# Patient Record
Sex: MALE | Race: WHITE | Employment: FULL TIME | URBAN - METROPOLITAN AREA
[De-identification: names, ages, dates, MRNs, and addresses within clinical notes are randomized per-mention and may not be internally consistent; named-entity substitution may affect disease eponyms.]

---

## 2022-01-13 ENCOUNTER — APPOINTMENT (OUTPATIENT)
Dept: CT IMAGING | Age: 52
DRG: 871 | End: 2022-01-13
Payer: COMMERCIAL

## 2022-01-13 ENCOUNTER — APPOINTMENT (OUTPATIENT)
Dept: GENERAL RADIOLOGY | Age: 52
DRG: 871 | End: 2022-01-13
Payer: COMMERCIAL

## 2022-01-13 ENCOUNTER — HOSPITAL ENCOUNTER (INPATIENT)
Age: 52
LOS: 2 days | Discharge: HOME | DRG: 871 | End: 2022-01-15
Attending: EMERGENCY MEDICINE | Admitting: INTERNAL MEDICINE
Payer: COMMERCIAL

## 2022-01-13 DIAGNOSIS — J96.01 ACUTE HYPOXEMIC RESPIRATORY FAILURE DUE TO COVID-19 (HCC): Primary | ICD-10-CM

## 2022-01-13 DIAGNOSIS — U07.1 ACUTE HYPOXEMIC RESPIRATORY FAILURE DUE TO COVID-19 (HCC): Primary | ICD-10-CM

## 2022-01-13 PROBLEM — A41.9 SEPSIS (HCC): Status: ACTIVE | Noted: 2022-01-13

## 2022-01-13 LAB
A/G RATIO: 1.3 (ref 1.1–2.2)
ALBUMIN SERPL-MCNC: 4.3 G/DL (ref 3.4–5)
ALP BLD-CCNC: 122 U/L (ref 40–129)
ALT SERPL-CCNC: 22 U/L (ref 10–40)
ANION GAP SERPL CALCULATED.3IONS-SCNC: 14 MMOL/L (ref 3–16)
AST SERPL-CCNC: 32 U/L (ref 15–37)
BASE EXCESS VENOUS: -3.8 MMOL/L (ref -3–3)
BASOPHILS ABSOLUTE: 0 K/UL (ref 0–0.2)
BASOPHILS RELATIVE PERCENT: 0.2 %
BILIRUB SERPL-MCNC: 0.9 MG/DL (ref 0–1)
BUN BLDV-MCNC: 15 MG/DL (ref 7–20)
CALCIUM SERPL-MCNC: 9 MG/DL (ref 8.3–10.6)
CARBOXYHEMOGLOBIN: 5.1 % (ref 0–1.5)
CHLORIDE BLD-SCNC: 97 MMOL/L (ref 99–110)
CO2: 24 MMOL/L (ref 21–32)
CREAT SERPL-MCNC: 0.7 MG/DL (ref 0.9–1.3)
EOSINOPHILS ABSOLUTE: 0 K/UL (ref 0–0.6)
EOSINOPHILS RELATIVE PERCENT: 0.1 %
GFR AFRICAN AMERICAN: >60
GFR NON-AFRICAN AMERICAN: >60
GLUCOSE BLD-MCNC: 251 MG/DL (ref 70–99)
HCO3 VENOUS: 22.5 MMOL/L (ref 23–29)
HCT VFR BLD CALC: 53.2 % (ref 40.5–52.5)
HEMOGLOBIN: 17.9 G/DL (ref 13.5–17.5)
LACTIC ACID, SEPSIS: 1.9 MMOL/L (ref 0.4–1.9)
LYMPHOCYTES ABSOLUTE: 0.5 K/UL (ref 1–5.1)
LYMPHOCYTES RELATIVE PERCENT: 3 %
MAGNESIUM: 1.8 MG/DL (ref 1.8–2.4)
MCH RBC QN AUTO: 30.5 PG (ref 26–34)
MCHC RBC AUTO-ENTMCNC: 33.6 G/DL (ref 31–36)
MCV RBC AUTO: 90.6 FL (ref 80–100)
METHEMOGLOBIN VENOUS: 0.3 %
MONOCYTES ABSOLUTE: 0.4 K/UL (ref 0–1.3)
MONOCYTES RELATIVE PERCENT: 2.4 %
NEUTROPHILS ABSOLUTE: 16.8 K/UL (ref 1.7–7.7)
NEUTROPHILS RELATIVE PERCENT: 94.3 %
O2 SAT, VEN: 67 %
O2 THERAPY: ABNORMAL
PCO2, VEN: 44.8 MMHG (ref 40–50)
PDW BLD-RTO: 13.7 % (ref 12.4–15.4)
PH VENOUS: 7.32 (ref 7.35–7.45)
PHOSPHORUS: 2.5 MG/DL (ref 2.5–4.9)
PLATELET # BLD: 272 K/UL (ref 135–450)
PMV BLD AUTO: 7.5 FL (ref 5–10.5)
PO2, VEN: 33.2 MMHG (ref 25–40)
POTASSIUM REFLEX MAGNESIUM: 5.8 MMOL/L (ref 3.5–5.1)
PROCALCITONIN: 0.14 NG/ML (ref 0–0.15)
RAPID INFLUENZA  B AGN: NEGATIVE
RAPID INFLUENZA A AGN: NEGATIVE
RBC # BLD: 5.87 M/UL (ref 4.2–5.9)
SARS-COV-2, NAAT: DETECTED
SODIUM BLD-SCNC: 135 MMOL/L (ref 136–145)
TCO2 CALC VENOUS: 24 MMOL/L
TOTAL PROTEIN: 7.7 G/DL (ref 6.4–8.2)
TROPONIN: <0.01 NG/ML
WBC # BLD: 17.8 K/UL (ref 4–11)

## 2022-01-13 PROCEDURE — 84100 ASSAY OF PHOSPHORUS: CPT

## 2022-01-13 PROCEDURE — 93005 ELECTROCARDIOGRAM TRACING: CPT | Performed by: PHYSICIAN ASSISTANT

## 2022-01-13 PROCEDURE — 87040 BLOOD CULTURE FOR BACTERIA: CPT

## 2022-01-13 PROCEDURE — 96374 THER/PROPH/DIAG INJ IV PUSH: CPT

## 2022-01-13 PROCEDURE — 83735 ASSAY OF MAGNESIUM: CPT

## 2022-01-13 PROCEDURE — 94761 N-INVAS EAR/PLS OXIMETRY MLT: CPT

## 2022-01-13 PROCEDURE — 84484 ASSAY OF TROPONIN QUANT: CPT

## 2022-01-13 PROCEDURE — 87804 INFLUENZA ASSAY W/OPTIC: CPT

## 2022-01-13 PROCEDURE — 6360000002 HC RX W HCPCS: Performed by: PHYSICIAN ASSISTANT

## 2022-01-13 PROCEDURE — 71045 X-RAY EXAM CHEST 1 VIEW: CPT

## 2022-01-13 PROCEDURE — 82803 BLOOD GASES ANY COMBINATION: CPT

## 2022-01-13 PROCEDURE — 83605 ASSAY OF LACTIC ACID: CPT

## 2022-01-13 PROCEDURE — 99284 EMERGENCY DEPT VISIT MOD MDM: CPT

## 2022-01-13 PROCEDURE — 85025 COMPLETE CBC W/AUTO DIFF WBC: CPT

## 2022-01-13 PROCEDURE — 6370000000 HC RX 637 (ALT 250 FOR IP): Performed by: PHYSICIAN ASSISTANT

## 2022-01-13 PROCEDURE — 2700000000 HC OXYGEN THERAPY PER DAY

## 2022-01-13 PROCEDURE — 6360000004 HC RX CONTRAST MEDICATION: Performed by: PHYSICIAN ASSISTANT

## 2022-01-13 PROCEDURE — 82010 KETONE BODYS QUAN: CPT

## 2022-01-13 PROCEDURE — 87635 SARS-COV-2 COVID-19 AMP PRB: CPT

## 2022-01-13 PROCEDURE — 84145 PROCALCITONIN (PCT): CPT

## 2022-01-13 PROCEDURE — 1200000000 HC SEMI PRIVATE

## 2022-01-13 PROCEDURE — 96361 HYDRATE IV INFUSION ADD-ON: CPT

## 2022-01-13 PROCEDURE — 2580000003 HC RX 258: Performed by: PHYSICIAN ASSISTANT

## 2022-01-13 PROCEDURE — 71260 CT THORAX DX C+: CPT

## 2022-01-13 PROCEDURE — 80053 COMPREHEN METABOLIC PANEL: CPT

## 2022-01-13 RX ORDER — MAGNESIUM SULFATE IN WATER 40 MG/ML
2000 INJECTION, SOLUTION INTRAVENOUS PRN
Status: DISCONTINUED | OUTPATIENT
Start: 2022-01-13 | End: 2022-01-15 | Stop reason: HOSPADM

## 2022-01-13 RX ORDER — 0.9 % SODIUM CHLORIDE 0.9 %
1000 INTRAVENOUS SOLUTION INTRAVENOUS ONCE
Status: COMPLETED | OUTPATIENT
Start: 2022-01-13 | End: 2022-01-13

## 2022-01-13 RX ORDER — GUAIFENESIN/DEXTROMETHORPHAN 100-10MG/5
5 SYRUP ORAL EVERY 4 HOURS PRN
Status: DISCONTINUED | OUTPATIENT
Start: 2022-01-13 | End: 2022-01-15 | Stop reason: HOSPADM

## 2022-01-13 RX ORDER — ONDANSETRON 2 MG/ML
4 INJECTION INTRAMUSCULAR; INTRAVENOUS ONCE
Status: COMPLETED | OUTPATIENT
Start: 2022-01-13 | End: 2022-01-13

## 2022-01-13 RX ORDER — 0.9 % SODIUM CHLORIDE 0.9 %
30 INTRAVENOUS SOLUTION INTRAVENOUS ONCE
Status: COMPLETED | OUTPATIENT
Start: 2022-01-13 | End: 2022-01-13

## 2022-01-13 RX ORDER — ACETAMINOPHEN 325 MG/1
650 TABLET ORAL EVERY 6 HOURS PRN
Status: DISCONTINUED | OUTPATIENT
Start: 2022-01-13 | End: 2022-01-15 | Stop reason: HOSPADM

## 2022-01-13 RX ORDER — ACETAMINOPHEN 500 MG
1000 TABLET ORAL ONCE
Status: COMPLETED | OUTPATIENT
Start: 2022-01-13 | End: 2022-01-13

## 2022-01-13 RX ORDER — DEXAMETHASONE SODIUM PHOSPHATE 10 MG/ML
6 INJECTION INTRAMUSCULAR; INTRAVENOUS EVERY 24 HOURS
Status: DISCONTINUED | OUTPATIENT
Start: 2022-01-14 | End: 2022-01-15 | Stop reason: HOSPADM

## 2022-01-13 RX ORDER — INSULIN GLARGINE 300 U/ML
80 INJECTION, SOLUTION SUBCUTANEOUS EVERY EVENING
Status: ON HOLD | COMMUNITY
End: 2022-01-15 | Stop reason: SDUPTHER

## 2022-01-13 RX ORDER — DEXTROSE MONOHYDRATE 25 G/50ML
12.5 INJECTION, SOLUTION INTRAVENOUS PRN
Status: DISCONTINUED | OUTPATIENT
Start: 2022-01-13 | End: 2022-01-15 | Stop reason: HOSPADM

## 2022-01-13 RX ORDER — SODIUM CHLORIDE 0.9 % (FLUSH) 0.9 %
10 SYRINGE (ML) INJECTION EVERY 12 HOURS SCHEDULED
Status: DISCONTINUED | OUTPATIENT
Start: 2022-01-13 | End: 2022-01-15 | Stop reason: HOSPADM

## 2022-01-13 RX ORDER — ACETAMINOPHEN 650 MG/1
650 SUPPOSITORY RECTAL EVERY 6 HOURS PRN
Status: DISCONTINUED | OUTPATIENT
Start: 2022-01-13 | End: 2022-01-15 | Stop reason: HOSPADM

## 2022-01-13 RX ORDER — SODIUM CHLORIDE 0.9 % (FLUSH) 0.9 %
10 SYRINGE (ML) INJECTION PRN
Status: DISCONTINUED | OUTPATIENT
Start: 2022-01-13 | End: 2022-01-15 | Stop reason: HOSPADM

## 2022-01-13 RX ORDER — POTASSIUM CHLORIDE 7.45 MG/ML
10 INJECTION INTRAVENOUS PRN
Status: DISCONTINUED | OUTPATIENT
Start: 2022-01-13 | End: 2022-01-15 | Stop reason: HOSPADM

## 2022-01-13 RX ORDER — ONDANSETRON 2 MG/ML
4 INJECTION INTRAMUSCULAR; INTRAVENOUS EVERY 6 HOURS PRN
Status: DISCONTINUED | OUTPATIENT
Start: 2022-01-13 | End: 2022-01-15 | Stop reason: HOSPADM

## 2022-01-13 RX ORDER — DEXTROSE MONOHYDRATE 50 MG/ML
100 INJECTION, SOLUTION INTRAVENOUS PRN
Status: DISCONTINUED | OUTPATIENT
Start: 2022-01-13 | End: 2022-01-15 | Stop reason: HOSPADM

## 2022-01-13 RX ORDER — PROMETHAZINE HYDROCHLORIDE 25 MG/1
12.5 TABLET ORAL EVERY 6 HOURS PRN
Status: DISCONTINUED | OUTPATIENT
Start: 2022-01-13 | End: 2022-01-15 | Stop reason: HOSPADM

## 2022-01-13 RX ORDER — VITAMIN B COMPLEX
2000 TABLET ORAL DAILY
Status: DISCONTINUED | OUTPATIENT
Start: 2022-01-13 | End: 2022-01-15 | Stop reason: HOSPADM

## 2022-01-13 RX ORDER — SODIUM CHLORIDE 9 MG/ML
25 INJECTION, SOLUTION INTRAVENOUS PRN
Status: DISCONTINUED | OUTPATIENT
Start: 2022-01-13 | End: 2022-01-15 | Stop reason: HOSPADM

## 2022-01-13 RX ORDER — NICOTINE POLACRILEX 4 MG
15 LOZENGE BUCCAL PRN
Status: DISCONTINUED | OUTPATIENT
Start: 2022-01-13 | End: 2022-01-15 | Stop reason: HOSPADM

## 2022-01-13 RX ADMIN — IOPAMIDOL 75 ML: 755 INJECTION, SOLUTION INTRAVENOUS at 20:32

## 2022-01-13 RX ADMIN — SODIUM CHLORIDE 2244 ML: 9 INJECTION, SOLUTION INTRAVENOUS at 19:40

## 2022-01-13 RX ADMIN — ACETAMINOPHEN 1000 MG: 500 TABLET ORAL at 18:40

## 2022-01-13 RX ADMIN — SODIUM CHLORIDE 1000 ML: 9 INJECTION, SOLUTION INTRAVENOUS at 18:33

## 2022-01-13 RX ADMIN — ONDANSETRON 4 MG: 2 INJECTION INTRAMUSCULAR; INTRAVENOUS at 18:40

## 2022-01-13 ASSESSMENT — PAIN SCALES - GENERAL
PAINLEVEL_OUTOF10: 0
PAINLEVEL_OUTOF10: 7
PAINLEVEL_OUTOF10: 5

## 2022-01-13 ASSESSMENT — PAIN SCALES - WONG BAKER: WONGBAKER_NUMERICALRESPONSE: 6

## 2022-01-13 NOTE — LETTER
Joy Pozo was seen and treated in our medical surgical department from 1/13/2022 to 1/15/2022. He may return to work without restrictions on 01/27/2022. If you have any questions or concerns, please don't hesitate to call.       Shahzad Huntley  Attending provider        Jaycob López RN  Discharging RN

## 2022-01-13 NOTE — ED PROVIDER NOTES
[address] [facility]  ED      CHIEF COMPLAINT  Concern For COVID-19 (+emesis +nausea, +diarrhea started over the weekend with a \"cold\")      SHARED SERVICE VISIT  Evaluated by LIDYA. My supervising physician was available for consultation. HISTORY OF PRESENT ILLNESS  Nate Willson is a 46 y.o. male history of diabetes, DKA; this emergency department for evaluation of nausea vomiting diarrhea. Patient states over the past week he has had cold-like symptoms with a cough and generalized feeling unwell. However this progressed and worsened to the point now he is unable to tolerate oral intake. He states he has not tried any medications. No over-the-counter Tylenol. He has not taken his diabetes medications due to not being able to tolerate oral intake. Is insulin-dependent. Unknown last A1c. No chest pain difficulty breathing. No pleuritic chest pain. No shortness of breath. No other complaints, modifying factors or associated symptoms. Nursing notes reviewed. Past Medical History:   Diagnosis Date    Chronic arm pain     Diabetes mellitus ([facility] [address]      Past Surgical History:   Procedure Laterality Date    APPENDECTOMY       History reviewed. No pertinent family history.   Social History     Socioeconomic History    Marital status:      Spouse name: Not on file    Number of children: Not on file    Years of education: Not on file    Highest education level: Not on file   Occupational History    Not on file   Tobacco Use    Smoking status: Current Every Day Smoker     Packs/day: 0.50     Types: Cigarettes    Smokeless tobacco: Not on file   Substance and Sexual Activity    Alcohol use: No    Drug use: No    Sexual activity: Not on file   Other Topics Concern    Not on file   Social History Narrative    Not on file     Social Determinants of Health     Financial Resource Strain:     Difficulty of Paying Living Expenses: Not on file   Food Insecurity:     Worried About Running Out of Food in the Last Year: Not on file    Ran Out of Food in the Last Year: Not on file   Transportation Needs:     Lack of Transportation (Medical): Not on file    Lack of Transportation (Non-Medical):  Not on file   Physical Activity:     Days of Exercise per Week: Not on file    Minutes of Exercise per Session: Not on file   Stress:     Feeling of Stress : Not on file   Social Connections:     Frequency of Communication with Friends and Family: Not on file    Frequency of Social Gatherings with Friends and Family: Not on file    Attends Mormonism Services: Not on file    Active Member of [address] [place] [address] [employer] or Organizations: Not on file    Attends Club or Organization Meetings: Not on file    Marital Status: Not on file   Intimate Partner Violence:     Fear of Current or Ex-Partner: Not on file    Emotionally Abused: Not on file    Physically Abused: Not on file    Sexually Abused: Not on file   Housing Stability:     Unable to Pay for Housing in the Last Year: Not on file    Number of [place] in the Last Year: Not on file    Unstable Housing in the Last Year: Not on file     Current Facility-Administered Medications   Medication Dose Route Frequency Provider Last Rate Last Admin    sodium chloride flush 0.9 % injection 10 mL  10 mL IntraVENous 2 times per day Weed Artis A Laure, DO        sodium chloride flush 0.9 % injection 10 mL  10 mL IntraVENous PRN Andres Beachzi, DO        0.9 % sodium chloride infusion  25 mL IntraVENous PRN April Fagan Laure, DO        potassium chloride 10 mEq/100 mL IVPB (Peripheral Line)  10 mEq IntraVENous PRN April Palacioss Laure, DO        magnesium sulfate 2000 mg in 50 mL IVPB premix  2,000 mg IntraVENous PRN Andres Kelsey, DO        promethazine (PHENERGAN) tablet 12.5 mg  12.5 mg Oral Q6H PRN Brittanimad A Laure, DO        Or    ondansetron (ZOFRAN) injection 4 mg  4 mg IntraVENous Q6H PRN Crystald JOSE Kelsey, DO        [START ON 1/14/2022] enoxaparin (LOVENOX) injection negative    PHYSICAL EXAM  /85   Pulse 129   Temp 98 °F (36.7 °C) (Temporal)   Resp 26   Ht 5' 10\" (1.778 m)   Wt 165 lb (74.8 kg)   SpO2 94%   BMI 23.68 kg/m²   GENERAL APPEARANCE: Awake and alert. Cooperative. Ill-appearing  HEAD: Normocephalic. Atraumatic. EYES: EOM's grossly intact. ENT: Mucous membranes are moist.   NECK: Supple. HEART: Tachycardic, no murmur. LUNGS: Respirations unlabored. CTAB. Good air exchange. Speaking comfortably in full sentences. ABDOMEN: Soft. Non-distended. Non-tender. No guarding or rebound. No masses. No organomegaly. EXTREMITIES: No peripheral edema. Moves all extremities equally. All extremities neurovascularly intact. SKIN: Warm and dry. No acute rashes. NEUROLOGICAL: Alert and oriented. CN's 2-12 intact. No gross facial drooping. Strength 5/5, sensation intact. PSYCHIATRIC: Normal mood and affect. RADIOLOGY  CT CHEST PULMONARY EMBOLISM W CONTRAST   Final Result   1. Scattered ground-glass opacities could represent COVID pneumonia. 2.  Artifact degraded evaluation of the pulmonary arteries. No acute   pulmonary embolism to the proximal segmental arteries. 3. Other findings as described.          XR CHEST PORTABLE   Final Result   Ill-defined bilateral pulmonary opacities suggesting COVID pneumonia in this   COVID positive patient             LABS  Labs Reviewed   COVID-19, RAPID - Abnormal; Notable for the following components:       Result Value    SARS-CoV-2, NAAT DETECTED (*)     All other components within normal limits    Narrative:     Gilda January tel. [phone],  Microbiology results called to and read back by Kira Galindo RN, 01/13/2022  19:16, by Mathew Serrano  Performed at:  [address], [address] [place], [place] [address]   Phone [phone]   CBC WITH AUTO DIFFERENTIAL - Abnormal; Notable for the following components:    WBC 17.8 (*)     Hemoglobin 17.9 (*)     Hematocrit 53.2 (*) Neutrophils Absolute 16.8 (*)     Lymphocytes Absolute 0.5 (*)     All other components within normal limits    Narrative:     Performed at:  [address], [facility] [place]   Phone [phone]   COMPREHENSIVE METABOLIC PANEL W/ REFLEX TO MG FOR LOW K - Abnormal; Notable for the following components:    Sodium 135 (*)     Potassium reflex Magnesium 5.8 (*)     Chloride 97 (*)     Glucose 251 (*)     CREATININE 0.7 (*)     All other components within normal limits    Narrative:     Performed at:  [address], [facility] [place]   Phone [phone]   BLOOD GAS, VENOUS - Abnormal; Notable for the following components:    pH, Hoang 7.318 (*)     HCO3, Venous 22.5 (*)     Base Excess, Hoang -3.8 (*)     Carboxyhemoglobin 5.1 (*)     All other components within normal limits    Narrative:     Performed at:  [address], [facility] [place]   Phone [phone]   RAPID INFLUENZA A/B ANTIGENS    Narrative:     Performed at:  [address], [facility] [place]   Phone [phone]   CULTURE, BLOOD 1   CULTURE, BLOOD 2   LEGIONELLA ANTIGEN, URINE   STREP PNEUMONIAE ANTIGEN   CULTURE, RESPIRATORY   MRSA DNA PROBE, NASAL   TROPONIN    Narrative:     Performed at:  [address], [facility] [place]   Phone [phone]   PHOSPHORUS    Narrative:     Performed at:  [address], [facility] [place]   Phone [phone]   MAGNESIUM    Narrative:     Performed at:  [address], [facility] [place]   Phone [phone]   LACTATE, SEPSIS    Narrative:     Performed at:  [address], [facility] [place]   Phone [phone]   PROCALCITONIN    Narrative:     Performed at:  [facility]) - [address],  [place]   Phone [phone]   LACTATE, SEPSIS   ACETONE   BETA-HYDROXYBUTYRATE   COMPREHENSIVE METABOLIC PANEL W/ REFLEX TO MG FOR LOW K   CBC WITH AUTO DIFFERENTIAL   PROTIME-INR   APTT   FIBRINOGEN   C-REACTIVE PROTEIN   C-REACTIVE PROTEIN   LACTATE DEHYDROGENASE   FERRITIN   D-DIMER, QUANTITATIVE   D-DIMER, QUANTITATIVE   TROPONIN   LACTIC ACID, PLASMA   VITAMIN D 25 HYDROXY   URINALYSIS   HEMOGLOBIN A1C   POTASSIUM   POCT GLUCOSE   POCT GLUCOSE   POCT GLUCOSE   POCT GLUCOSE       PROCEDURES  Unless otherwise noted below, none  Procedures    CRITICAL CARE TIME  The total critical care time spent while evaluating and treating this patient was 34minutes. This excludes time spent doing separately billable procedures. This includes time at the bedside, data interpretation, medication management, obtaining critical history from collateral sources if the patient is unable to provide it directly, and physician consultation. Specifics of interventions taken and potentially life-threatening diagnostic considerations are listed above in the medical decision making. MDM  MDM  57-year-old male history of diabetes prior episodes of DKA, insulin-dependent has ED for evaluation of nausea vomiting diarrhea and cold-like symptoms. Unable to tolerate oral intake. Arrival to ED patient was tachycardic at a rate 150. He is rather ill-appearing with frequent coughing. He has no complaints of chest pain, pleuritic chest pain difficulty breathing. Oxygen is 95% on room air. He is afebrile. Normotensive. At this time initial concerns for diabetic ketoacidosis given his history. Will evaluate and start IV fluids give Zofran and Tylenol. Also test for influenza and COVID-19. He was placed on cardiac monitor to risk of arrhythmia.     On continuous continuous pulse oximetry patient did become hypoxic while on room air; and was placed on nasal cannula 2L. Patient will receive additional fluids totaling 30 cc/kg to help correct this tachycardia. Given his persistent tachycardia and hypoxic state will obtain CTA of the chest to rule out pulmonary embolism. Pending that we will plan to admit to the hospital service for acute respiratory failure secondary to COVID-19. DISPOSITION  Admission for respiratory failure    CLINICAL IMPRESSION  1.  Acute hypoxemic respiratory failure due to COVID-19 [facility])            Festus Coronel PA-C  01/13/22 3753

## 2022-01-14 PROBLEM — U07.1 COVID: Status: ACTIVE | Noted: 2022-01-14

## 2022-01-14 LAB
A/G RATIO: 1.2 (ref 1.1–2.2)
ALBUMIN SERPL-MCNC: 3.2 G/DL (ref 3.4–5)
ALP BLD-CCNC: 81 U/L (ref 40–129)
ALT SERPL-CCNC: 15 U/L (ref 10–40)
ANION GAP SERPL CALCULATED.3IONS-SCNC: 10 MMOL/L (ref 3–16)
APTT: 28.8 SEC (ref 26.2–38.6)
AST SERPL-CCNC: 14 U/L (ref 15–37)
BASOPHILS ABSOLUTE: 0 K/UL (ref 0–0.2)
BASOPHILS RELATIVE PERCENT: 0.1 %
BETA-HYDROXYBUTYRATE: 0.81 MMOL/L (ref 0–0.27)
BILIRUB SERPL-MCNC: 0.4 MG/DL (ref 0–1)
BILIRUBIN URINE: NEGATIVE
BLOOD, URINE: NEGATIVE
BUN BLDV-MCNC: 18 MG/DL (ref 7–20)
C-REACTIVE PROTEIN: 70.7 MG/L (ref 0–5.1)
CALCIUM SERPL-MCNC: 8 MG/DL (ref 8.3–10.6)
CHLORIDE BLD-SCNC: 100 MMOL/L (ref 99–110)
CLARITY: CLEAR
CO2: 23 MMOL/L (ref 21–32)
COLOR: YELLOW
CREAT SERPL-MCNC: 0.7 MG/DL (ref 0.9–1.3)
D DIMER: 851 NG/ML DDU (ref 0–229)
EKG ATRIAL RATE: 139 BPM
EKG DIAGNOSIS: NORMAL
EKG P AXIS: 63 DEGREES
EKG P-R INTERVAL: 136 MS
EKG Q-T INTERVAL: 288 MS
EKG QRS DURATION: 76 MS
EKG QTC CALCULATION (BAZETT): 438 MS
EKG R AXIS: -4 DEGREES
EKG T AXIS: 74 DEGREES
EKG VENTRICULAR RATE: 139 BPM
EOSINOPHILS ABSOLUTE: 0 K/UL (ref 0–0.6)
EOSINOPHILS RELATIVE PERCENT: 0 %
ESTIMATED AVERAGE GLUCOSE: 223.1 MG/DL
FERRITIN: 166.2 NG/ML (ref 30–400)
FIBRINOGEN: 397 MG/DL (ref 200–397)
GFR AFRICAN AMERICAN: >60
GFR NON-AFRICAN AMERICAN: >60
GLUCOSE BLD-MCNC: 101 MG/DL (ref 70–99)
GLUCOSE BLD-MCNC: 102 MG/DL (ref 70–99)
GLUCOSE BLD-MCNC: 138 MG/DL (ref 70–99)
GLUCOSE BLD-MCNC: 166 MG/DL (ref 70–99)
GLUCOSE BLD-MCNC: 230 MG/DL (ref 70–99)
GLUCOSE BLD-MCNC: 252 MG/DL (ref 70–99)
GLUCOSE BLD-MCNC: 267 MG/DL (ref 70–99)
GLUCOSE BLD-MCNC: 331 MG/DL (ref 70–99)
GLUCOSE BLD-MCNC: 53 MG/DL (ref 70–99)
GLUCOSE URINE: >=1000 MG/DL
HBA1C MFR BLD: 9.4 %
HCT VFR BLD CALC: 43.4 % (ref 40.5–52.5)
HEMOGLOBIN: 14.8 G/DL (ref 13.5–17.5)
INR BLD: 1.03 (ref 0.88–1.12)
KETONES, URINE: ABNORMAL MG/DL
L. PNEUMOPHILA SEROGP 1 UR AG: NORMAL
LACTATE DEHYDROGENASE: 163 U/L (ref 100–190)
LACTIC ACID, SEPSIS: 0.9 MMOL/L (ref 0.4–1.9)
LACTIC ACID: 0.9 MMOL/L (ref 0.4–2)
LEUKOCYTE ESTERASE, URINE: NEGATIVE
LYMPHOCYTES ABSOLUTE: 0.3 K/UL (ref 1–5.1)
LYMPHOCYTES RELATIVE PERCENT: 3.1 %
MCH RBC QN AUTO: 31.3 PG (ref 26–34)
MCHC RBC AUTO-ENTMCNC: 34.2 G/DL (ref 31–36)
MCV RBC AUTO: 91.5 FL (ref 80–100)
MICROSCOPIC EXAMINATION: ABNORMAL
MONOCYTES ABSOLUTE: 0.2 K/UL (ref 0–1.3)
MONOCYTES RELATIVE PERCENT: 1.7 %
NEUTROPHILS ABSOLUTE: 10 K/UL (ref 1.7–7.7)
NEUTROPHILS RELATIVE PERCENT: 95.1 %
NITRITE, URINE: NEGATIVE
PDW BLD-RTO: 13.9 % (ref 12.4–15.4)
PERFORMED ON: ABNORMAL
PH UA: 5 (ref 5–8)
PLATELET # BLD: 210 K/UL (ref 135–450)
PMV BLD AUTO: 7.2 FL (ref 5–10.5)
POTASSIUM REFLEX MAGNESIUM: 4.3 MMOL/L (ref 3.5–5.1)
POTASSIUM SERPL-SCNC: 4.5 MMOL/L (ref 3.5–5.1)
PROTEIN UA: NEGATIVE MG/DL
PROTHROMBIN TIME: 11.7 SEC (ref 9.9–12.7)
RBC # BLD: 4.74 M/UL (ref 4.2–5.9)
SODIUM BLD-SCNC: 133 MMOL/L (ref 136–145)
SPECIFIC GRAVITY UA: 1.01 (ref 1–1.03)
STREP PNEUMONIAE ANTIGEN, URINE: NORMAL
TOTAL PROTEIN: 5.9 G/DL (ref 6.4–8.2)
TROPONIN: <0.01 NG/ML
URINE TYPE: ABNORMAL
UROBILINOGEN, URINE: 0.2 E.U./DL
VITAMIN D 25-HYDROXY: 16.9 NG/ML
WBC # BLD: 10.5 K/UL (ref 4–11)

## 2022-01-14 PROCEDURE — 6370000000 HC RX 637 (ALT 250 FOR IP): Performed by: INTERNAL MEDICINE

## 2022-01-14 PROCEDURE — 2700000000 HC OXYGEN THERAPY PER DAY

## 2022-01-14 PROCEDURE — 2580000003 HC RX 258: Performed by: INTERNAL MEDICINE

## 2022-01-14 PROCEDURE — 1200000000 HC SEMI PRIVATE

## 2022-01-14 PROCEDURE — 84132 ASSAY OF SERUM POTASSIUM: CPT

## 2022-01-14 PROCEDURE — 82306 VITAMIN D 25 HYDROXY: CPT

## 2022-01-14 PROCEDURE — 85610 PROTHROMBIN TIME: CPT

## 2022-01-14 PROCEDURE — 36415 COLL VENOUS BLD VENIPUNCTURE: CPT

## 2022-01-14 PROCEDURE — 87449 NOS EACH ORGANISM AG IA: CPT

## 2022-01-14 PROCEDURE — 85379 FIBRIN DEGRADATION QUANT: CPT

## 2022-01-14 PROCEDURE — 83615 LACTATE (LD) (LDH) ENZYME: CPT

## 2022-01-14 PROCEDURE — 83605 ASSAY OF LACTIC ACID: CPT

## 2022-01-14 PROCEDURE — 2500000003 HC RX 250 WO HCPCS: Performed by: INTERNAL MEDICINE

## 2022-01-14 PROCEDURE — 85025 COMPLETE CBC W/AUTO DIFF WBC: CPT

## 2022-01-14 PROCEDURE — 94761 N-INVAS EAR/PLS OXIMETRY MLT: CPT

## 2022-01-14 PROCEDURE — 80053 COMPREHEN METABOLIC PANEL: CPT

## 2022-01-14 PROCEDURE — 87641 MR-STAPH DNA AMP PROBE: CPT

## 2022-01-14 PROCEDURE — 83036 HEMOGLOBIN GLYCOSYLATED A1C: CPT

## 2022-01-14 PROCEDURE — 93010 ELECTROCARDIOGRAM REPORT: CPT | Performed by: INTERNAL MEDICINE

## 2022-01-14 PROCEDURE — 81003 URINALYSIS AUTO W/O SCOPE: CPT

## 2022-01-14 PROCEDURE — 6360000002 HC RX W HCPCS: Performed by: INTERNAL MEDICINE

## 2022-01-14 PROCEDURE — 82728 ASSAY OF FERRITIN: CPT

## 2022-01-14 PROCEDURE — 85730 THROMBOPLASTIN TIME PARTIAL: CPT

## 2022-01-14 PROCEDURE — 85384 FIBRINOGEN ACTIVITY: CPT

## 2022-01-14 PROCEDURE — 84484 ASSAY OF TROPONIN QUANT: CPT

## 2022-01-14 PROCEDURE — 86140 C-REACTIVE PROTEIN: CPT

## 2022-01-14 RX ORDER — INSULIN GLARGINE 100 [IU]/ML
45 INJECTION, SOLUTION SUBCUTANEOUS 2 TIMES DAILY
Status: DISCONTINUED | OUTPATIENT
Start: 2022-01-14 | End: 2022-01-15 | Stop reason: HOSPADM

## 2022-01-14 RX ORDER — INSULIN GLARGINE 100 [IU]/ML
60 INJECTION, SOLUTION SUBCUTANEOUS ONCE
Status: COMPLETED | OUTPATIENT
Start: 2022-01-14 | End: 2022-01-14

## 2022-01-14 RX ADMIN — INSULIN GLARGINE 60 UNITS: 100 INJECTION, SOLUTION SUBCUTANEOUS at 12:02

## 2022-01-14 RX ADMIN — INSULIN LISPRO 1 UNITS: 100 INJECTION, SOLUTION INTRAVENOUS; SUBCUTANEOUS at 04:21

## 2022-01-14 RX ADMIN — INSULIN LISPRO 12 UNITS: 100 INJECTION, SOLUTION INTRAVENOUS; SUBCUTANEOUS at 08:00

## 2022-01-14 RX ADMIN — SODIUM CHLORIDE, PRESERVATIVE FREE 10 ML: 5 INJECTION INTRAVENOUS at 00:55

## 2022-01-14 RX ADMIN — SODIUM CHLORIDE, PRESERVATIVE FREE 10 ML: 5 INJECTION INTRAVENOUS at 21:41

## 2022-01-14 RX ADMIN — ENOXAPARIN SODIUM 30 MG: 30 INJECTION SUBCUTANEOUS at 21:40

## 2022-01-14 RX ADMIN — CEFTRIAXONE SODIUM 1000 MG: 1 INJECTION, POWDER, FOR SOLUTION INTRAMUSCULAR; INTRAVENOUS at 00:53

## 2022-01-14 RX ADMIN — SODIUM CHLORIDE, PRESERVATIVE FREE 10 ML: 5 INJECTION INTRAVENOUS at 08:06

## 2022-01-14 RX ADMIN — INSULIN LISPRO 6 UNITS: 100 INJECTION, SOLUTION INTRAVENOUS; SUBCUTANEOUS at 12:03

## 2022-01-14 RX ADMIN — INSULIN LISPRO 20 UNITS: 100 INJECTION, SOLUTION INTRAVENOUS; SUBCUTANEOUS at 16:37

## 2022-01-14 RX ADMIN — Medication 2000 UNITS: at 00:54

## 2022-01-14 RX ADMIN — INSULIN LISPRO 9 UNITS: 100 INJECTION, SOLUTION INTRAVENOUS; SUBCUTANEOUS at 00:59

## 2022-01-14 RX ADMIN — ENOXAPARIN SODIUM 30 MG: 30 INJECTION SUBCUTANEOUS at 08:06

## 2022-01-14 RX ADMIN — DOXYCYCLINE 100 MG: 100 INJECTION, POWDER, LYOPHILIZED, FOR SOLUTION INTRAVENOUS at 02:30

## 2022-01-14 RX ADMIN — INSULIN LISPRO 20 UNITS: 100 INJECTION, SOLUTION INTRAVENOUS; SUBCUTANEOUS at 12:02

## 2022-01-14 RX ADMIN — Medication 2000 UNITS: at 08:06

## 2022-01-14 RX ADMIN — DEXAMETHASONE SODIUM PHOSPHATE 6 MG: 10 INJECTION INTRAMUSCULAR; INTRAVENOUS at 00:49

## 2022-01-14 ASSESSMENT — PAIN SCALES - GENERAL: PAINLEVEL_OUTOF10: 0

## 2022-01-14 NOTE — CARE COORDINATION
CASE MANAGEMENT INITIAL ASSESSMENT      Reviewed chart and completed assessment with patient:via phone  Explained Case Management role/services. Primary contact information:[facility] Decision Maker :   Primary Decision Maker: Lisa Luther - Brother/Sister - [phone]    Secondary Decision Maker: Dory Goodwin - Aunt/Uncle - [phone]          Can this person be reached and be able to respond quickly, such as within a few minutes or hours? Yes    Admit date/status:1/13/22  Diagnosis:sepsis   Is this a Readmission?:  No      Insurance:med[employer]   Precert required for SNF: No       3 night stay required: No    Living arrangements, Adls, care needs, prior to admission:staying with cousin in his home. In town currently for work. Transportation:private     Durable Medical Equipment at home:  Walker__Cane__RTS__ BSC__Shower Chair__  02__ HHN__ CPAP__  BiPap__  Hospital Bed__ W/C___ Other__________    Services in the home and/or outpatient, prior to [address] [facility] [address] Notification (HEN):needed for SNF    Barriers to discharge:no PCP in this area, stated per his work since he is out of town he must follow with local clinics    Plan/comments:spoke with patient. Plans on returning home to [facility] at d/c. Currently on 3LNC o2. Reported IPTA and denied any DCP needs. Will follow for new O2.  Jocelyn Rivera RN       ECOC on chart for MD signature

## 2022-01-14 NOTE — H&P
Hospital Medicine History & Physical      PCP: Referring Not In System (Inactive)    Date of Admission: 1/13/2022    Date of Service: Pt seen/examined on 1/13/2022    Pt seen/examined face to face on and admitted as inpatient with expected LOS greater than two midnights due to medical therapy    Chief Complaint:    Chief Complaint   Patient presents with    Concern For COVID-19     +emesis +nausea, +diarrhea started over the weekend with a \"cold\"        History Of Present Illness:      46 y.o. male who presented to [facility] with past medical history of diabetes, presented to the ED with history diarrhea. Patient reported that she been having some shortness of breath with cough and generally feeling unwell is progressively worsening causing patient not to be able to take oral intake or try Tylenol for pain. Patient also reports that she has not been taking any of her glycemic medication. No associated chest pain, abdominal pain, dysuria. No known relieving or exacerbating factor. Patient continues to smoke daily. Patient reports that he is not vaccinated and denies having any exposures with any family members or family gatherings. .  Patient reported that shortness of breath, and worse today specifically with exertion. Past Medical History:          Diagnosis Date    Chronic arm pain     Diabetes mellitus ([address]        Past Surgical History:          Procedure Laterality Date    APPENDECTOMY         Medications Prior to Admission:      Prior to Admission medications    Medication Sig Start Date End Date Taking? Authorizing Provider   insulin glargine (LANTUS) 100 UNIT/ML injection vial Inject 75 Units into the skin nightly. Historical Provider, MD   insulin lispro (HUMALOG) 100 UNIT/ML injection vial Inject  into the skin 3 times daily (before meals). ssi    Historical Provider, MD   ibuprofen (ADVIL;MOTRIN) 600 MG tablet Take 1 tablet by mouth every 6 hours as needed for Pain.  9/21/14 Aj Ron MD       Allergies:  Patient has no known allergies. Social History:          TOBACCO:   reports that he has been smoking cigarettes. He has been smoking about 0.50 packs per day. He does not have any smokeless tobacco history on file. ETOH:   reports no history of alcohol use. E-Cigarettes/Vaping Use     Questions Responses    E-Cigarette/Vaping Use     Start Date     Passive Exposure     Quit Date     Counseling Given     Comments             Family History:      Reviewed in detail, and noncontributory    History reviewed. No pertinent family history. REVIEW OF SYSTEMS:     Constitutional:  No Fever, No Chills, No Night Sweats  ENT/Mouth:  No Nasal Congestion,  No Hoarseness, No new mouth lesion  Eyes:  No Eye Pain, No Redness, No Discharge  Cardiovascular:  No Chest Pain, No Orthopnea, No Palpitations  Respiratory: + Cough, No Sputum, + dyspnea  Gastrointestinal: No Vomiting, No Diarrhea, No abdominal pain  Genitourinary: No Urinary Frequency, No Hematuria, No Urinary pain  Musculoskeletal:  No worsening Arthralgias, No worsening Myalgias  Skin:  No new Skin Lesions, No new skin rash  Neuro:  No new weakness, No new numbness. Psych:  No suicial ideation, No Violence ideation    PHYSICAL EXAM PERFORMED:    BP (!) 160/84   Pulse 125   Temp 98 °F (36.7 °C) (Temporal)   Resp 24   Ht 5' 10\" (1.778 m)   Wt 165 lb (74.8 kg)   SpO2 100%   BMI 23.68 kg/m²     General appearance:  mild acute distress, appears older than stated age  HEENT:   atraumatic, sclera anicteric, Conjunctivae clear. Neck: Supple,Trachea midline, no goiter  Respiratory:minimal accessory muscle usage, Normal respiratory effort. rhonchi bilaterally without wheezing  Cardiovascular: Tachycardia capillary refill 2 seconds  Abdomen: Soft, non-tender, non-distended with normal bowel sounds. Musculoskeletal:  No clubbing, cyanosis. trace edema LE bilaterally. Skin: turgor normal.  No new rashes or lesions.   Neurologic: Alert and oriented x4, no new focal sensory/motor deficits. Labs:     Recent Labs     01/13/22 1812   WBC 17.8*   HGB 17.9*   HCT 53.2*        Recent Labs     01/13/22 1812   *   K 5.8*   CL 97*   CO2 24   BUN 15   CREATININE 0.7*   CALCIUM 9.0   PHOS 2.5     Recent Labs     01/13/22 1812   AST 32   ALT 22   BILITOT 0.9   ALKPHOS 122     No results for input(s): INR in the last 72 hours.   Recent Labs     01/13/22 1812   TROPONINI <0.01       Urinalysis:    No results found for: [place], BACTERIA, RBCUA, BLOODU, [address], GLUCOSEU    Radiology:     CXR: I have reviewed the CXR with the following interpretation:   Bilateral pulmonary opacities  EKG:  I have reviewed the EKG with the following interpretation:   Sinus tachycardia   XR CHEST PORTABLE   Final Result   Ill-defined bilateral pulmonary opacities suggesting COVID pneumonia in this   COVID positive patient         CT CHEST PULMONARY EMBOLISM W CONTRAST    (Results Pending)       ASSESSMENT AND PLAN:    Active Hospital Problems    Diagnosis Date Noted    Sepsis ([facility] [address] [A41.9] 01/13/2022     Sepsis: Leukocytosis, tachycardia  Secondary to pulmonary opacities  Blood cultures, UA, chest x-ray obtained  No lactic acidosis  Empirically on doxycycline    Pneumonia:  Patient COVID-positive currently  Empirically on vancomycin, Doxy ceftriaxone to cover for community-acquired, atypical ,and postviral pneumonia    Uncontrolled Type 2 Diabetes: Insulin sliding scale  Essential Hypertension: Continue home medication    Diet: Cardiac diabetic diet    DVT Prophylaxis: lovenox    Dispo:   Expected LOS greater than two [facility]

## 2022-01-14 NOTE — ED PROVIDER NOTES
I independently performed a history and physical on Rosie Burton. All diagnostic, treatment, and disposition decisions were made by myself in conjunction with the advanced practice provider. EKG: Sinus tachycardia, rate 139, leftward axis, QTC within normal limits, no acute ST or T wave abnormalities. No priors available for comparison. 68-year-old male with history of diabetes, unvaccinated, presents with hypoxia and tachycardia due to COVID-19 infection. Admitted to the hospitalist service. For further details of [facility] emergency department encounter, please see Graciela LOVELACE's documentation.              Anahi Velasquez MD  01/13/22 6951

## 2022-01-14 NOTE — ACP (ADVANCE CARE PLANNING)
Advance Care Planning     General Advance Care Planning (ACP) Conversation    Date of Conversation: 1/13/2022  Conducted with: Patient with Decision Making Capacity    Healthcare Decision Maker:    Primary Decision Maker: Irma Traylor - Brother/Sister - [phone]    Secondary Decision Maker: Ever Correa - Aunt/Uncle - [phone]  Click here to complete Healthcare Decision Makers including selection of the Healthcare Decision Maker Relationship (ie \"Primary\"). Today we documented Decision Maker(s) consistent with Legal Next of Kin hierarchy.     Content/Action Overview:  Has NO ACP documents/care preferences - information provided, considering goals and options  Reviewed DNR/DNI and patient elects Full Code (Attempt Resuscitation)      Length of Voluntary ACP Conversation in minutes:  <16 minutes (Non-Billable)    Romario Blanca RN

## 2022-01-14 NOTE — PROGRESS NOTES
Shift assessment completed. Pt A&Ox4, VSS on 3L O2. Crackles heard throughout lungs bilaterally. Pt states N&V has subsided since admission. Pt educated on importance of using the incentive spirometer and proning when sleeping. Denies any pain or needs at this time. Bed locked and in lowest position. Call light & bedside table are within reach.

## 2022-01-14 NOTE — PROGRESS NOTES
Hospitalist Progress Note      PCP: Referring Not In System (Inactive)    Date of Admission: 1/13/2022    Chief Complaint:  dyspnea       Subjective:  He is feeling better since being started on oxygen. Dyspnea improved. Coughing less often as well. Medications:  Reviewed    Infusion Medications    sodium chloride      dextrose       Scheduled Medications    insulin glargine  60 Units SubCUTAneous Once    insulin glargine  45 Units SubCUTAneous BID    insulin lispro  20 Units SubCUTAneous TID     sodium chloride flush  10 mL IntraVENous 2 times per day    enoxaparin  30 mg SubCUTAneous BID    dexamethasone  6 mg IntraVENous Q24H    Vitamin D  2,000 Units Oral Daily    insulin lispro  0-18 Units SubCUTAneous Q4H     PRN Meds: sodium chloride flush, sodium chloride, potassium chloride, magnesium sulfate, promethazine **OR** ondansetron, guaiFENesin-dextromethorphan, acetaminophen **OR** acetaminophen, glucose, dextrose, glucagon (rDNA), dextrose      Intake/Output Summary (Last 24 hours) at 1/14/2022 1023  Last data filed at 1/14/2022 0323  Gross per 24 hour   Intake 400 ml   Output --   Net 400 ml       Physical Exam Performed:    /76   Pulse 104   Temp 98.3 °F (36.8 °C) (Oral)   Resp 16   Ht 5' 10\" (1.778 m)   Wt 168 lb 14.4 oz (76.6 kg)   SpO2 91%   BMI 24.23 kg/m²     General appearance: No apparent distress, appears stated age and cooperative. HEENT: Pupils equal, round, and reactive to light. Conjunctivae/corneas clear. Neck: Supple, with full range of motion. No jugular venous distention. Trachea midline. Respiratory:  Normal respiratory effort. Clear to auscultation, bilaterally without Rales/Wheezes/Rhonchi. Cardiovascular: Regular rate and rhythm with normal S1/S2 without murmurs, rubs or gallops. Abdomen: Soft, non-tender, non-distended with normal bowel sounds. Musculoskeletal: No clubbing, cyanosis or edema bilaterally.   Full range of motion without deformity. Skin: Skin color, texture, turgor normal.  No rashes or lesions. Neurologic:  Neurovascularly intact without any focal sensory/motor deficits. Cranial nerves: II-XII intact, grossly non-focal.  Psychiatric: Alert and oriented, thought content appropriate, normal insight  Capillary Refill: Brisk,3 seconds, normal   Peripheral Pulses: +2 palpable, equal bilaterally       Labs:   Recent Labs     01/13/22 1812 01/14/22  0559   WBC 17.8* 10.5   HGB 17.9* 14.8   HCT 53.2* 43.4    210     Recent Labs     01/13/22 1812 01/14/22  0021 01/14/22  0559   *  --  133*   K 5.8* 4.5 4.3   CL 97*  --  100   CO2 24  --  23   BUN 15  --  18   CREATININE 0.7*  --  0.7*   CALCIUM 9.0  --  8.0*   PHOS 2.5  --   --      Recent Labs     01/13/22 1812 01/14/22  0559   AST 32 14*   ALT 22 15   BILITOT 0.9 0.4   ALKPHOS 122 81     Recent Labs     01/14/22  0559   INR 1.03     Recent Labs     01/13/22 1812 01/14/22  0559   TROPONINI <0.01 <0.01       Urinalysis:      Lab Results   Component Value Date    NITRU Negative 01/14/2022    BLOODU Negative 01/14/2022    SPECGRAV 1.015 01/14/2022    GLUCOSEU >=1000 01/14/2022       Radiology:  CT CHEST PULMONARY EMBOLISM W CONTRAST   Final Result   1. Scattered ground-glass opacities could represent COVID pneumonia. 2.  Artifact degraded evaluation of the pulmonary arteries. No acute   pulmonary embolism to the proximal segmental arteries. 3. Other findings as described. XR CHEST PORTABLE   Final Result   Ill-defined bilateral pulmonary opacities suggesting COVID pneumonia in this   COVID positive patient                 Assessment/Plan:    Active Hospital Problems    Diagnosis     COVID [U07.1]     Sepsis ([facility] [address] [A41.9]        46 Y M with a h/o smoking and DM2 presents with a weeks of cough and dyspnea and tested positive for COVID. Unvaccinated. COVID-19 pneumonia, with acute hypoxic respiratory failure  - steroids.     - if his oxygen requirement worsens will consider baricitinib  - if his CRP is very high will consider tocilizumab  - too late for remdesivir. - no PE on CTPA. Procalcitonin negative. - wean to RA as tolerated. The patient has no supplemental O2 requirement at baseline. - symptoms began 1/7. He can come out of isolation on 1/27. DM2. He normally takes glargine 80 qhs, then only 3-4 u SSI TIDAC. Adjusted regimen while here on steroids. F/u A1c. Tobacco smoking, nicotine dependence. Encouraged to quit. Educated about risks. DVT Prophylaxis: enoxaparin  Diet: ADULT DIET; Regular; 3 carb choices (45 gm/meal); Low Fat/Low Chol/High Fiber/2 gm Na  Code Status: Full Code    PT/OT Eval Status: not indicated    Dispo - ideally when he is weaned to RA. Perhaps 1/16 - 1/20. He lives at home.       Jeff Fonseca MD

## 2022-01-14 NOTE — PROGRESS NOTES
4 Eyes Skin Assessment     The patient is being assess for  Admission    I agree that 2 RN's have performed a thorough Head to Toe Skin Assessment on the patient. ALL assessment sites listed below have been assessed. Areas assessed by both nurses:   [x]   Head, Face, and Ears   [x]   Shoulders, Back, and Chest  [x]   Arms, Elbows, and Hands   [x]   Coccyx, Sacrum, and Ischum  [x]   Legs, Feet, and Heels        Does the Patient have Skin Breakdown?   No         Cirilo Prevention initiated:  NA   Wound Care Orders initiated:  NA      [facility] nurse consulted for Pressure Injury (Stage 3,4, Unstageable, DTI, NWPT, and Complex wounds):  NA      Nurse 1 eSignature: Electronically signed by Mary Martel RN on 1/14/22 at 2:11 AM EST    **SHARE this note so that the co-signing nurse is able to place an eSignature**    Nurse 2 eSignature: Electronically signed by Bibi Rendon RN on 1/14/22 at 4:09 AM EST

## 2022-01-14 NOTE — CONSULTS
Pharmacy Note  Vancomycin Consult      Dx: COVID+, w/ 2° PNA/Sepsis    Recent Labs     01/13/22  1812   CREATININE 0.7*       Recent Labs     01/13/22 1812   WBC 17.8*       Estimated Creatinine Clearance: 126 mL/min (A) (based on SCr of 0.7 mg/dL (L)).     Goal Vancomycin trough: 15-20 mcg/mL   Goal Vancomycin AUC: 400-600     Assessment/Plan:  Vancomycin 1500mg IVPB x 1,   then Vancomycin 1250mg IVPB Q12H  Vancomycin level 1/14/22 2200  Est AUC - 520  Est trough - 15.3mcg/mL    Frannie Spicer PharmD 1/13/2022 8:24 PM

## 2022-01-14 NOTE — PROGRESS NOTES
Perfect serve to Dr. Fabien Zambrano (1/14/22 at [facility])    [phone] Hospital or Facility: [facility] From: Almodovar Cassette RE: Edmund Pena 1970 RM: [phone] Need clarification on pt's IV antibiotics. One time dose Rocephin IV is currently infusing. Has doxycycline IV and Vanco IV due. Do you want these given? Please advise. Update: 1/14/22 1/14/22 at [address] Dr. Fabien Zambrano responded to above message. Dr. Fabien Zambrano clarified to give Doxycycline IVPB as ordered and discontinue vancomycin IV. See MAR.

## 2022-01-15 VITALS
BODY MASS INDEX: 23.78 KG/M2 | HEIGHT: 70 IN | HEART RATE: 85 BPM | RESPIRATION RATE: 16 BRPM | DIASTOLIC BLOOD PRESSURE: 82 MMHG | SYSTOLIC BLOOD PRESSURE: 135 MMHG | TEMPERATURE: 97.8 F | OXYGEN SATURATION: 94 % | WEIGHT: 166.1 LBS

## 2022-01-15 LAB
ANION GAP SERPL CALCULATED.3IONS-SCNC: 13 MMOL/L (ref 3–16)
BASOPHILS ABSOLUTE: 0 K/UL (ref 0–0.2)
BASOPHILS RELATIVE PERCENT: 0.1 %
BUN BLDV-MCNC: 15 MG/DL (ref 7–20)
C-REACTIVE PROTEIN: 61.4 MG/L (ref 0–5.1)
CALCIUM SERPL-MCNC: 8.3 MG/DL (ref 8.3–10.6)
CHLORIDE BLD-SCNC: 97 MMOL/L (ref 99–110)
CO2: 25 MMOL/L (ref 21–32)
CREAT SERPL-MCNC: 0.7 MG/DL (ref 0.9–1.3)
EOSINOPHILS ABSOLUTE: 0 K/UL (ref 0–0.6)
EOSINOPHILS RELATIVE PERCENT: 0 %
GFR AFRICAN AMERICAN: >60
GFR NON-AFRICAN AMERICAN: >60
GLUCOSE BLD-MCNC: 130 MG/DL (ref 70–99)
GLUCOSE BLD-MCNC: 254 MG/DL (ref 70–99)
GLUCOSE BLD-MCNC: 268 MG/DL (ref 70–99)
GLUCOSE BLD-MCNC: 277 MG/DL (ref 70–99)
GLUCOSE BLD-MCNC: 335 MG/DL (ref 70–99)
HCT VFR BLD CALC: 42.3 % (ref 40.5–52.5)
HEMOGLOBIN: 14.4 G/DL (ref 13.5–17.5)
LYMPHOCYTES ABSOLUTE: 0.7 K/UL (ref 1–5.1)
LYMPHOCYTES RELATIVE PERCENT: 9.9 %
MCH RBC QN AUTO: 30.7 PG (ref 26–34)
MCHC RBC AUTO-ENTMCNC: 34.1 G/DL (ref 31–36)
MCV RBC AUTO: 90 FL (ref 80–100)
MONOCYTES ABSOLUTE: 0.1 K/UL (ref 0–1.3)
MONOCYTES RELATIVE PERCENT: 1.7 %
MRSA SCREEN RT-PCR: NORMAL
NEUTROPHILS ABSOLUTE: 5.9 K/UL (ref 1.7–7.7)
NEUTROPHILS RELATIVE PERCENT: 88.3 %
PDW BLD-RTO: 13.4 % (ref 12.4–15.4)
PERFORMED ON: ABNORMAL
PLATELET # BLD: 207 K/UL (ref 135–450)
PMV BLD AUTO: 7.8 FL (ref 5–10.5)
POTASSIUM REFLEX MAGNESIUM: 4.6 MMOL/L (ref 3.5–5.1)
RBC # BLD: 4.7 M/UL (ref 4.2–5.9)
SODIUM BLD-SCNC: 135 MMOL/L (ref 136–145)
WBC # BLD: 6.7 K/UL (ref 4–11)

## 2022-01-15 PROCEDURE — 2580000003 HC RX 258: Performed by: INTERNAL MEDICINE

## 2022-01-15 PROCEDURE — 80048 BASIC METABOLIC PNL TOTAL CA: CPT

## 2022-01-15 PROCEDURE — 6370000000 HC RX 637 (ALT 250 FOR IP): Performed by: INTERNAL MEDICINE

## 2022-01-15 PROCEDURE — 6360000002 HC RX W HCPCS: Performed by: INTERNAL MEDICINE

## 2022-01-15 PROCEDURE — 85025 COMPLETE CBC W/AUTO DIFF WBC: CPT

## 2022-01-15 PROCEDURE — 86140 C-REACTIVE PROTEIN: CPT

## 2022-01-15 PROCEDURE — 36415 COLL VENOUS BLD VENIPUNCTURE: CPT

## 2022-01-15 RX ORDER — INSULIN GLARGINE 300 U/ML
95 INJECTION, SOLUTION SUBCUTANEOUS EVERY EVENING
Qty: 1 PEN | Refills: 0
Start: 2022-01-15 | End: 2022-08-14

## 2022-01-15 RX ADMIN — Medication 2000 UNITS: at 09:40

## 2022-01-15 RX ADMIN — SODIUM CHLORIDE, PRESERVATIVE FREE 10 ML: 5 INJECTION INTRAVENOUS at 09:40

## 2022-01-15 RX ADMIN — INSULIN GLARGINE 45 UNITS: 100 INJECTION, SOLUTION SUBCUTANEOUS at 09:41

## 2022-01-15 RX ADMIN — INSULIN LISPRO 9 UNITS: 100 INJECTION, SOLUTION INTRAVENOUS; SUBCUTANEOUS at 12:15

## 2022-01-15 RX ADMIN — ENOXAPARIN SODIUM 30 MG: 30 INJECTION SUBCUTANEOUS at 09:39

## 2022-01-15 RX ADMIN — INSULIN LISPRO 10 UNITS: 100 INJECTION, SOLUTION INTRAVENOUS; SUBCUTANEOUS at 09:40

## 2022-01-15 RX ADMIN — DEXAMETHASONE SODIUM PHOSPHATE 6 MG: 10 INJECTION INTRAMUSCULAR; INTRAVENOUS at 00:26

## 2022-01-15 RX ADMIN — INSULIN LISPRO 10 UNITS: 100 INJECTION, SOLUTION INTRAVENOUS; SUBCUTANEOUS at 12:16

## 2022-01-15 RX ADMIN — INSULIN LISPRO 6 UNITS: 100 INJECTION, SOLUTION INTRAVENOUS; SUBCUTANEOUS at 06:33

## 2022-01-15 RX ADMIN — INSULIN LISPRO 12 UNITS: 100 INJECTION, SOLUTION INTRAVENOUS; SUBCUTANEOUS at 09:41

## 2022-01-15 ASSESSMENT — PAIN SCALES - GENERAL
PAINLEVEL_OUTOF10: 0
PAINLEVEL_OUTOF10: 0

## 2022-01-15 NOTE — PROGRESS NOTES
Weaned off O2, saturations maintained above 92% on RA at rest. Denies any needs, will continue to monitor.

## 2022-01-15 NOTE — DISCHARGE SUMMARY
Hospital Medicine Discharge Summary    Patient ID: Gallito Avila      Patient's PCP: Referring Not In System (Inactive)    Admit Date: 1/13/2022     Discharge Date:   01/15/22     Admitting Provider: Arnulfo Savage DO     Discharge Provider: Anderson Archer MD     Discharge Diagnoses: Active Hospital Problems    Diagnosis     COVID [U07.1]     Sepsis ([facility] [address] [A41.9]        The patient was seen and examined on day of discharge and this discharge summary is in conjunction with any daily progress note from day of discharge. Hospital Course:  46 Y M with a h/o smoking and DM2 presents with a weeks of cough and dyspnea and tested positive for COVID. Unvaccinated.         COVID-19 pneumonia, with acute hypoxic respiratory failure  - steroids. He recovered rather quickly. No need to continue steroids after discharge. - no PE on CTPA. Procalcitonin negative. - weaned to RA. The patient has no supplemental O2 requirement at baseline. - symptoms began 1/7. He can come out of isolation on 1/27.     DM2. He normally takes glargine 80 qhs, but only 3-4 u SSI TIDAC. A1c 9.4. Adjusted regimen.     Tobacco smoking, nicotine dependence. Encouraged to quit. Educated about risks. Physical Exam Performed:     /79   Pulse 90   Temp 97.5 °F (36.4 °C) (Oral)   Resp 16   Ht 5' 10\" (1.778 m)   Wt 166 lb 1.6 oz (75.3 kg)   SpO2 93%   BMI 23.83 kg/m²       General appearance: No apparent distress, appears stated age and cooperative. HEENT: Pupils equal, round, and reactive to light. Conjunctivae/corneas clear. Neck: Supple, with full range of motion. No jugular venous distention. Trachea midline. Respiratory:  Normal respiratory effort. Clear to auscultation, bilaterally without Rales/Wheezes/Rhonchi. Cardiovascular: Regular rate and rhythm with normal S1/S2 without murmurs, rubs or gallops. Abdomen: Soft, non-tender, non-distended with normal bowel sounds.   Musculoskeletal: No clubbing, cyanosis or edema bilaterally. Full range of motion without deformity. Skin: Skin color, texture, turgor normal.  No rashes or lesions. Neurologic:  Neurovascularly intact without any focal sensory/motor deficits. Cranial nerves: II-XII intact, grossly non-focal.  Psychiatric: Alert and oriented, thought content appropriate, normal insight  Capillary Refill: Brisk,3 seconds, normal   Peripheral Pulses: +2 palpable, equal bilaterally       Labs: For convenience and continuity at follow-up the following most recent labs are provided:      CBC:    Lab Results   Component Value Date    WBC 6.7 01/15/2022    HGB 14.4 01/15/2022    HCT 42.3 01/15/2022     01/15/2022       Renal:    Lab Results   Component Value Date     01/15/2022    K 4.6 01/15/2022    CL 97 01/15/2022    CO2 25 01/15/2022    BUN 15 01/15/2022    CREATININE 0.7 01/15/2022    CALCIUM 8.3 01/15/2022    PHOS 2.5 01/13/2022         Significant Diagnostic Studies    Radiology:   CT CHEST PULMONARY EMBOLISM W CONTRAST   Final Result   1. Scattered ground-glass opacities could represent COVID pneumonia. 2.  Artifact degraded evaluation of the pulmonary arteries. No acute   pulmonary embolism to the proximal segmental arteries. 3. Other findings as described. XR CHEST PORTABLE   Final Result   Ill-defined bilateral pulmonary opacities suggesting COVID pneumonia in this   COVID positive patient                Consults:     IP CONSULT TO HOSPITALIST  PHARMACY TO DOSE VANCOMYCIN    Disposition:  home     Condition at Discharge: Stable    Discharge Instructions/Follow-up:  Follow up with PCP within 1-2 weeks.        Code Status:  Full Code     Activity: activity as tolerated    Diet: diabetic diet      Discharge Medications:     Current Discharge Medication List           Details   Insulin Glargine, 2 Unit Dial, (TOUJEO MAX SOLOSTAR) 300 UNIT/ML SOPN Inject 95 Units into the skin every evening  Qty: 1 pen, Refills: 0 Details   insulin lispro (HUMALOG) 100 UNIT/ML injection vial Inject  into the skin 3 times daily (before meals). ssi      ibuprofen (ADVIL;MOTRIN) 600 MG tablet Take 1 tablet by mouth every 6 hours as needed for Pain. Qty: 30 tablet, Refills: 0               Time Spent on discharge is more than 30 minutes in the examination, evaluation, counseling and review of medications and discharge plan. Signed:    Marquis Thomas MD   1/15/2022      Thank you Referring Not In System (Inactive) for the opportunity to be involved in this patient's care. If you have any questions or concerns please feel free to contact me at [phone].

## 2022-01-15 NOTE — PLAN OF CARE
Problem: Airway Clearance - Ineffective  Goal: Achieve or maintain patent airway  Outcome: Completed     Problem: Gas Exchange - Impaired  Goal: Absence of hypoxia  Outcome: Completed  Goal: Promote optimal lung function  Outcome: Completed     Problem: Breathing Pattern - Ineffective  Goal: Ability to achieve and maintain a regular respiratory rate  Outcome: Completed     Problem:  Body Temperature -  Risk of, Imbalanced  Goal: Ability to maintain a body temperature within defined limits  Outcome: Completed

## 2022-01-15 NOTE — PROGRESS NOTES
Discharge education provided to patient at bedside. Excuse letter for work given to patient at this time. All questions answered. Pt verbalized understanding.

## 2022-01-17 ENCOUNTER — CARE COORDINATION (OUTPATIENT)
Dept: CASE MANAGEMENT | Age: 52
End: 2022-01-17

## 2022-01-17 LAB — BLOOD CULTURE, ROUTINE: NORMAL

## 2022-01-18 ENCOUNTER — CARE COORDINATION (OUTPATIENT)
Dept: CASE MANAGEMENT | Age: 52
End: 2022-01-18

## 2022-01-18 NOTE — CARE COORDINATION
Transitions of Care Call  Call within 2 business days of discharge: Yes    Patient: Fernando Duarte Patient : 1970   MRN: [id number]  Reason for Admission: Acute hypoxemic respiratory failure due to COVID 19  Discharge Date: 1/15/22 RARS: Readmission Risk Score: 7.1 ( )      Last Discharge [facility]       Complaint Diagnosis Description Type Department Provider    22 Concern For COVID-19 Acute hypoxemic respiratory failure due to COVID-19 [facility]) ED to Hosp-Admission (Discharged) (ADMITTED) ROSEMARY Hammond MD; Valentin Paniagua. .. Was this an external facility discharge? No Discharge Facility: N/A    Challenges to be reviewed by the provider   Additional needs identified to be addressed with provider: No                   Encounter was not routed to provider for escalation. Method of communication with provider: none. Discussed COVID-19 related testing which was: available at this time. Test results were: positive. Patient informed of results, if available? Yes. Current Symptoms: no new symptoms and no worsening symptoms    Reviewed New or Changed Meds: yes    Do you have what you need at home?  Durable Medical Equipment ordered at discharge: None   Home Health/Outpatient orders at discharge: none   Was patient discharged with a pulse oximeter? No Discussed and confirmed pulse oximeter discharge instructions and when to notify provider or seek emergency care. Patient education provided: Reviewed appropriate site of care based on symptoms and resources available to patient including: Urgent care clinics and When to call 911. Follow up appointment scheduled within 7 days of discharge: no. If no appointment scheduled, scheduling offered: Patient will be seeking Primary Health care from \"[employer]\" at his job - once he returns after quarantine. No future appointments.     Interventions: Obtained and reviewed discharge summary and/or continuity of care documents  Reviewed discharge

## 2022-08-14 ENCOUNTER — HOSPITAL ENCOUNTER (EMERGENCY)
Age: 52
Discharge: HOME | End: 2022-08-14
Payer: COMMERCIAL

## 2022-08-14 VITALS
OXYGEN SATURATION: 98 % | DIASTOLIC BLOOD PRESSURE: 89 MMHG | HEART RATE: 83 BPM | BODY MASS INDEX: 25.05 KG/M2 | HEIGHT: 70 IN | SYSTOLIC BLOOD PRESSURE: 140 MMHG | TEMPERATURE: 97.8 F | WEIGHT: 175 LBS | RESPIRATION RATE: 16 BRPM

## 2022-08-14 DIAGNOSIS — Z76.0 ENCOUNTER FOR MEDICATION REFILL: Primary | ICD-10-CM

## 2022-08-14 LAB
GLUCOSE BLD-MCNC: 173 MG/DL (ref 70–99)
PERFORMED ON: ABNORMAL

## 2022-08-14 PROCEDURE — 99283 EMERGENCY DEPT VISIT LOW MDM: CPT

## 2022-08-14 RX ORDER — INSULIN GLARGINE 300 U/ML
95 INJECTION, SOLUTION SUBCUTANEOUS EVERY EVENING
Qty: 1 PEN | Refills: 0 | Status: SHIPPED | OUTPATIENT
Start: 2022-08-14 | End: 2022-08-28

## 2022-08-14 RX ORDER — ONDANSETRON 4 MG/1
4 TABLET, ORALLY DISINTEGRATING ORAL ONCE
Status: DISCONTINUED | OUTPATIENT
Start: 2022-08-14 | End: 2022-08-14 | Stop reason: HOSPADM

## 2022-08-14 ASSESSMENT — PAIN - FUNCTIONAL ASSESSMENT: PAIN_FUNCTIONAL_ASSESSMENT: NONE - DENIES PAIN

## 2022-08-14 NOTE — ED PROVIDER NOTES
[facility] Emergency Department    CHIEF COMPLAINT  Medication Refill (Out of metformin and insulin)      SHARED SERVICE VISIT  Evaluated by LIDYA. My supervising physician was available for consultation. HISTORY OF PRESENT ILLNESS  Guille Dang is a 46 y.o. male who presents to the ED complaining of needing a medication refill. Patient states that he is currently off of his metformin and his insulin. Patient states his last dose was yesterday morning. Patient states that he was trying to contact his primary care provider in order to get a refill and was told that he needed to be reevaluated by his primary care provider. Patient states that currently he was in [place] and his primary care provider is in [place] and is having difficulty making the drive due to the current economy. Patient states he has a current virtual appointment with his primary care provider on the 18th. Patient is needing medication refill today. Patient does have a history of uncontrolled type 2 diabetes. Patient denies any headaches, vomiting, fevers or chills. Patient denies any chest pain, shortness of breath, or dyspnea on exertion. Patient denies any nausea, vomiting, diarrhea, or abdominal pain. Patient denies any urinary symptoms. Patient denies any coughing or sneezing. Patient denies any sore throats or vision changes. Patient denies any new dental pain. Patient denies any recent travel or sick contacts. No other complaints, modifying factors or associated symptoms. Nursing notes reviewed. Past Medical History:   Diagnosis Date    Chronic arm pain     Diabetes mellitus ([facility] [address]      Past Surgical History:   Procedure Laterality Date    APPENDECTOMY       History reviewed. No pertinent family history.   Social History     Socioeconomic History    Marital status:      Spouse name: Not on file    Number of children: Not on file    Years of education: Not on file Highest education level: Not on file   Occupational History    Not on file   Tobacco Use    Smoking status: Every Day     Packs/day: 0.50     Types: Cigarettes    Smokeless tobacco: Not on file   Substance and Sexual Activity    Alcohol use: No    Drug use: No    Sexual activity: Not on file   Other Topics Concern    Not on file   Social History Narrative    Not on file     Social Determinants of Health     Financial Resource Strain: Not on file   Food Insecurity: Not on file   Transportation Needs: Not on file   Physical Activity: Not on file   Stress: Not on file   Social Connections: Not on file   Intimate Partner Violence: Not on file   Housing Stability: Not on file     No current facility-administered medications for this encounter. Current Outpatient Medications   Medication Sig Dispense Refill    metFORMIN (GLUCOPHAGE) 1000 MG tablet Take 500 mg by mouth in the morning and 500 mg in the evening. Take with meals. Insulin Glargine, 2 Unit Dial, (TOUJEO MAX SOLOSTAR) 300 UNIT/ML SOPN Inject 95 Units into the skin every evening 1 pen 0    insulin lispro (HUMALOG) 100 UNIT/ML injection vial Inject  into the skin 3 times daily (before meals). ssi      ibuprofen (ADVIL;MOTRIN) 600 MG tablet Take 1 tablet by mouth every 6 hours as needed for Pain. (Patient not taking: No sig reported) 30 tablet 0     No Known Allergies    REVIEW OF SYSTEMS  10 systems reviewed, pertinent positives per HPI otherwise noted to be negative    PHYSICAL EXAM  BP (!) 131/96   Pulse (!) 102   Temp 97.8 °F (36.6 °C) (Oral)   Resp 16   Ht 5' 10\" (1.778 m)   Wt 175 lb (79.4 kg)   SpO2 96%   BMI 25.11 kg/m²   GENERAL APPEARANCE: Awake and alert. Cooperative. No acute distress. Nontoxic in appearance  HEAD: Normocephalic. Atraumatic. No cordero signs or raccoon eyes. EYES: PERRL. EOM's grossly intact. ENT: Mucous membranes are pink and moist.   NECK: Supple. Full range of motion. No nuchal rigidity.   No tracheal tenderness or deviation. No stridor. HEART: RRR. No murmurs, rubs or gallops. Normal S1-S2. No S3 or S4.  LUNGS: Respirations unlabored. CTAB. Good air exchange. Speaking comfortably in full sentences. ABDOMEN: Soft. Non-distended. Non-tender. No guarding or rebound. No masses. No organomegaly. EXTREMITIES: No peripheral edema. Moves all extremities equally. All extremities neurovascularly intact. SKIN: Warm and dry. No acute rashes. NEUROLOGICAL: Alert and oriented. CN's 2-12 intact. No gross facial drooping. Strength 5/5, sensation intact. PSYCHIATRIC: Normal mood and affect. RADIOLOGY  No results found. ED COURSE  57-year-old male presents to the ED for medication refill. He has taken his last dose of metformin as well as insulin yesterday and has been unable to get a refill from his primary care provider. He currently lives in [place] right now and his primary care provider is in [address] [place] [address]. He currently is having difficulties due to the current economy driving back and forth. Is been instructed by his primary care provider that they wanted to see him in clinic before they refill medications with a current appointment scheduled for 18 August.  Benign physical exam.  Currently has no complaints besides medication refill. Will provide patient with 2 weeks worth of medication with strict instructions to follow-up with primary care provider on the 18th. Triage vitals /96, pulse of 102, respirations 16, temperature 97.8 °F, SPO2 96% on room air. Patient received 1 L of cold fluids with heart rate decreasing. Blood pressure remained hypertensive but he states he has a history of hypertension. Risk management discussed and shared decision making had with patient and/or surrogate. All questions were answered. Patient will follow up with primary care provider for further evaluation/treatment. All questions answered. Patient will return to ED for new/worsening symptoms.     Patient was sent home with a prescription for metformin and insulin. CRITICAL CARE TIME  0 minutes of critical care time spent not including separately billable procedures. MDM  Results for orders placed or performed during the hospital encounter of 08/14/22   POCT Glucose   Result Value Ref Range    POC Glucose 173 (H) 70 - 99 mg/dl    Performed on ACCU-CHEK        I estimate there is LOW risk for ACUTE CORONARY SYNDROME, INTRACRANIAL HEMORRHAGE, MALIGNANT DYSRHYTHMIA, MENINGITIS, PNEUMONIA, PULMONARY EMBOLISM, SEPSIS, SUBARACHNOID HEMORRHAGE, SUBDURAL HEMATOMA, STROKE, or URINARY TRACT INFECTION, thus I consider the discharge disposition reasonable. Brianda Negro and I have discussed the diagnosis and risks, and we agree with discharging home to follow-up with their primary doctor. We also discussed returning to the Emergency Department immediately if new or worsening symptoms occur. We have discussed the symptoms which are most concerning (e.g., changing or worsening pain, weakness, vomiting, fever) that necessitate immediate return. Final Impression    1. Encounter for medication refill        Blood pressure (!) 140/89, pulse 83, temperature 97.8 °F (36.6 °C), temperature source Oral, resp. rate 16, height 5' 10\" (1.778 m), weight 175 lb (79.4 kg), SpO2 98 %. DISPOSITION  Patient was discharged to home in good condition.          Christin Shea PA-C  08/14/22 1456

## 2023-01-27 ENCOUNTER — APPOINTMENT (OUTPATIENT)
Dept: CT IMAGING | Age: 53
DRG: 194 | End: 2023-01-27
Payer: COMMERCIAL

## 2023-01-27 ENCOUNTER — HOSPITAL ENCOUNTER (INPATIENT)
Age: 53
LOS: 2 days | Discharge: HOME | DRG: 194 | End: 2023-01-30
Attending: EMERGENCY MEDICINE | Admitting: INTERNAL MEDICINE
Payer: COMMERCIAL

## 2023-01-27 ENCOUNTER — APPOINTMENT (OUTPATIENT)
Dept: GENERAL RADIOLOGY | Age: 53
DRG: 194 | End: 2023-01-27
Payer: COMMERCIAL

## 2023-01-27 DIAGNOSIS — J96.01 ACUTE RESPIRATORY FAILURE WITH HYPOXIA (HCC): Primary | ICD-10-CM

## 2023-01-27 DIAGNOSIS — R11.0 NAUSEA: ICD-10-CM

## 2023-01-27 DIAGNOSIS — F17.200 SMOKER: ICD-10-CM

## 2023-01-27 LAB
A/G RATIO: 2 (ref 1.1–2.2)
ALBUMIN SERPL-MCNC: 4.5 G/DL (ref 3.4–5)
ALP BLD-CCNC: 110 U/L (ref 40–129)
ALT SERPL-CCNC: 17 U/L (ref 10–40)
ANION GAP SERPL CALCULATED.3IONS-SCNC: 12 MMOL/L (ref 3–16)
AST SERPL-CCNC: 14 U/L (ref 15–37)
BASE EXCESS VENOUS: -1 MMOL/L (ref -3–3)
BASOPHILS ABSOLUTE: 0 K/UL (ref 0–0.2)
BASOPHILS RELATIVE PERCENT: 0.4 %
BILIRUB SERPL-MCNC: 0.6 MG/DL (ref 0–1)
BILIRUBIN URINE: NEGATIVE
BLOOD, URINE: NEGATIVE
BUN BLDV-MCNC: 17 MG/DL (ref 7–20)
CALCIUM SERPL-MCNC: 9 MG/DL (ref 8.3–10.6)
CARBOXYHEMOGLOBIN: 4.9 % (ref 0–1.5)
CHLORIDE BLD-SCNC: 99 MMOL/L (ref 99–110)
CLARITY: CLEAR
CO2: 24 MMOL/L (ref 21–32)
COLOR: YELLOW
CREAT SERPL-MCNC: 0.6 MG/DL (ref 0.9–1.3)
EOSINOPHILS ABSOLUTE: 0 K/UL (ref 0–0.6)
EOSINOPHILS RELATIVE PERCENT: 0.3 %
GFR SERPL CREATININE-BSD FRML MDRD: >60 ML/MIN/{1.73_M2}
GLUCOSE BLD-MCNC: 242 MG/DL (ref 70–99)
GLUCOSE BLD-MCNC: 74 MG/DL (ref 70–99)
GLUCOSE BLD-MCNC: 77 MG/DL (ref 70–99)
GLUCOSE URINE: >=1000 MG/DL
HCO3 VENOUS: 26.3 MMOL/L (ref 23–29)
HCT VFR BLD CALC: 45.7 % (ref 40.5–52.5)
HEMOGLOBIN: 15.3 G/DL (ref 13.5–17.5)
KETONES, URINE: NEGATIVE MG/DL
LACTIC ACID: 1 MMOL/L (ref 0.4–2)
LEUKOCYTE ESTERASE, URINE: NEGATIVE
LIPASE: 23 U/L (ref 13–60)
LYMPHOCYTES ABSOLUTE: 0.8 K/UL (ref 1–5.1)
LYMPHOCYTES RELATIVE PERCENT: 5.9 %
MCH RBC QN AUTO: 30.9 PG (ref 26–34)
MCHC RBC AUTO-ENTMCNC: 33.6 G/DL (ref 31–36)
MCV RBC AUTO: 91.9 FL (ref 80–100)
METHEMOGLOBIN VENOUS: 0.2 %
MICROSCOPIC EXAMINATION: ABNORMAL
MONOCYTES ABSOLUTE: 0.8 K/UL (ref 0–1.3)
MONOCYTES RELATIVE PERCENT: 5.9 %
NEUTROPHILS ABSOLUTE: 11.8 K/UL (ref 1.7–7.7)
NEUTROPHILS RELATIVE PERCENT: 87.5 %
NITRITE, URINE: NEGATIVE
O2 SAT, VEN: 84 %
O2 THERAPY: ABNORMAL
PCO2, VEN: 53.9 MMHG (ref 40–50)
PDW BLD-RTO: 14.3 % (ref 12.4–15.4)
PERFORMED ON: ABNORMAL
PERFORMED ON: NORMAL
PH UA: 5.5 (ref 5–8)
PH VENOUS: 7.31 (ref 7.35–7.45)
PLATELET # BLD: 293 K/UL (ref 135–450)
PMV BLD AUTO: 6.7 FL (ref 5–10.5)
PO2, VEN: 50.2 MMHG (ref 25–40)
POTASSIUM REFLEX MAGNESIUM: 4 MMOL/L (ref 3.5–5.1)
PROTEIN UA: NEGATIVE MG/DL
RBC # BLD: 4.97 M/UL (ref 4.2–5.9)
SODIUM BLD-SCNC: 135 MMOL/L (ref 136–145)
SPECIFIC GRAVITY UA: 1.02 (ref 1–1.03)
TCO2 CALC VENOUS: 28 MMOL/L
TOTAL PROTEIN: 6.8 G/DL (ref 6.4–8.2)
TROPONIN: <0.01 NG/ML
TROPONIN: <0.01 NG/ML
URINE REFLEX TO CULTURE: ABNORMAL
URINE TYPE: ABNORMAL
UROBILINOGEN, URINE: 0.2 E.U./DL
WBC # BLD: 13.4 K/UL (ref 4–11)

## 2023-01-27 PROCEDURE — 82803 BLOOD GASES ANY COMBINATION: CPT

## 2023-01-27 PROCEDURE — 85025 COMPLETE CBC W/AUTO DIFF WBC: CPT

## 2023-01-27 PROCEDURE — 93005 ELECTROCARDIOGRAM TRACING: CPT | Performed by: PHYSICIAN ASSISTANT

## 2023-01-27 PROCEDURE — 83036 HEMOGLOBIN GLYCOSYLATED A1C: CPT

## 2023-01-27 PROCEDURE — 6360000002 HC RX W HCPCS: Performed by: PHYSICIAN ASSISTANT

## 2023-01-27 PROCEDURE — 71260 CT THORAX DX C+: CPT | Performed by: EMERGENCY MEDICINE

## 2023-01-27 PROCEDURE — 96376 TX/PRO/DX INJ SAME DRUG ADON: CPT

## 2023-01-27 PROCEDURE — 83690 ASSAY OF LIPASE: CPT

## 2023-01-27 PROCEDURE — 96361 HYDRATE IV INFUSION ADD-ON: CPT

## 2023-01-27 PROCEDURE — 84484 ASSAY OF TROPONIN QUANT: CPT

## 2023-01-27 PROCEDURE — 2500000003 HC RX 250 WO HCPCS: Performed by: PHYSICIAN ASSISTANT

## 2023-01-27 PROCEDURE — 96375 TX/PRO/DX INJ NEW DRUG ADDON: CPT

## 2023-01-27 PROCEDURE — 71045 X-RAY EXAM CHEST 1 VIEW: CPT

## 2023-01-27 PROCEDURE — 83605 ASSAY OF LACTIC ACID: CPT

## 2023-01-27 PROCEDURE — 81003 URINALYSIS AUTO W/O SCOPE: CPT

## 2023-01-27 PROCEDURE — 99285 EMERGENCY DEPT VISIT HI MDM: CPT

## 2023-01-27 PROCEDURE — 80053 COMPREHEN METABOLIC PANEL: CPT

## 2023-01-27 PROCEDURE — 96374 THER/PROPH/DIAG INJ IV PUSH: CPT

## 2023-01-27 PROCEDURE — 6360000004 HC RX CONTRAST MEDICATION: Performed by: EMERGENCY MEDICINE

## 2023-01-27 PROCEDURE — 2580000003 HC RX 258: Performed by: PHYSICIAN ASSISTANT

## 2023-01-27 PROCEDURE — 74177 CT ABD & PELVIS W/CONTRAST: CPT

## 2023-01-27 RX ORDER — ONDANSETRON 2 MG/ML
4 INJECTION INTRAMUSCULAR; INTRAVENOUS ONCE
Status: COMPLETED | OUTPATIENT
Start: 2023-01-27 | End: 2023-01-27

## 2023-01-27 RX ORDER — 0.9 % SODIUM CHLORIDE 0.9 %
1000 INTRAVENOUS SOLUTION INTRAVENOUS ONCE
Status: COMPLETED | OUTPATIENT
Start: 2023-01-27 | End: 2023-01-27

## 2023-01-27 RX ORDER — DEXTROSE AND SODIUM CHLORIDE 5; .9 G/100ML; G/100ML
1000 INJECTION, SOLUTION INTRAVENOUS ONCE
Status: COMPLETED | OUTPATIENT
Start: 2023-01-27 | End: 2023-01-27

## 2023-01-27 RX ORDER — FAMOTIDINE 10 MG/ML
20 INJECTION, SOLUTION INTRAVENOUS ONCE
Status: COMPLETED | OUTPATIENT
Start: 2023-01-27 | End: 2023-01-27

## 2023-01-27 RX ADMIN — ONDANSETRON 4 MG: 2 INJECTION INTRAMUSCULAR; INTRAVENOUS at 19:06

## 2023-01-27 RX ADMIN — SODIUM CHLORIDE 1000 ML: 9 INJECTION, SOLUTION INTRAVENOUS at 20:34

## 2023-01-27 RX ADMIN — IOPAMIDOL 75 ML: 755 INJECTION, SOLUTION INTRAVENOUS at 22:04

## 2023-01-27 RX ADMIN — FAMOTIDINE 20 MG: 10 INJECTION, SOLUTION INTRAVENOUS at 20:34

## 2023-01-27 RX ADMIN — DEXTROSE AND SODIUM CHLORIDE 1000 ML: 5; 900 INJECTION, SOLUTION INTRAVENOUS at 19:07

## 2023-01-27 RX ADMIN — ONDANSETRON 4 MG: 2 INJECTION INTRAMUSCULAR; INTRAVENOUS at 20:34

## 2023-01-27 ASSESSMENT — PAIN - FUNCTIONAL ASSESSMENT: PAIN_FUNCTIONAL_ASSESSMENT: NONE - DENIES PAIN

## 2023-01-27 NOTE — ED PROVIDER NOTES
[facility]  ED  EMERGENCY DEPARTMENT ENCOUNTER        Pt Name: Kunal Pagan  MRN: [id number]  [place] 1970  Date of evaluation: 1/27/2023  Provider: Rusty Wong  PCP: Referring Not In System (Inactive)  Note Started: 6:49 PM EST 1/27/23       I have seen and evaluated this patient with my supervising physician Rosalie Pickering MD.      [address] [place] [address]       Chief Complaint   Patient presents with    Nausea     Starting a few hours ago. Denies any pain. States he \"keeps on belching\". BS 74 at triage       HISTORY OF PRESENT ILLNESS: 1 or more Elements     History From: Patient    Limitations to history : None    Kunal Pagan is a 45 y.o. male who presents to the emergency department with complaint of nausea without vomiting. Also feels like his blood sugar might be high or that he might be in ketosis as he states he has had this before. He states onset 4 PM.  The patient's ED i-STAT blood sugar 74. Patient is currently on metformin 500 mg 1/2 tablet twice daily and has had both doses today. Patient takes Lantus 80 units at 4:30 PM.  He did not have dose today. He also utilizes sliding scale of Humalog insulin has not had any today. He has not had appropriate nutrition or food today. He reports no chest pain or shortness of breath. He reports no vomiting, diarrhea or constipation. No urinary complaints such as urgency, frequency or dysuria. The patient does report and I did find in the epic system as he goes to [facility] his A1c was 7.1 on 9/12/2022. Nursing Notes were all reviewed and agreed with or any disagreements were addressed in the HPI. REVIEW OF SYSTEMS :      Review of Systems    Positives and Pertinent negatives as per HPI.      SURGICAL HISTORY     Past Surgical History:   Procedure Laterality Date    APPENDECTOMY         CURRENTMEDICATIONS       Previous Medications    IBUPROFEN (ADVIL;MOTRIN) 600 MG TABLET    Take 1 tablet by mouth every 6 hours as needed for Pain. INSULIN GLARGINE, 2 UNIT DIAL, (TOUJEO MAX SOLOSTAR) 300 UNIT/ML SOPN    Inject 95 Units into the skin every evening for 14 days    INSULIN LISPRO (HUMALOG) 100 UNIT/ML INJECTION VIAL    Inject  into the skin 3 times daily (before meals). ssi    METFORMIN (GLUCOPHAGE) 500 MG TABLET    Take 1 tablet by mouth in the morning and 1 tablet in the evening. Take with meals. Do all this for 14 days. ALLERGIES     Patient has no known allergies. FAMILYHISTORY     History reviewed. No pertinent family history. SOCIAL HISTORY       Social History     Tobacco Use    Smoking status: Every Day     Packs/day: 0.50     Types: Cigarettes   Substance Use Topics    Alcohol use: No    Drug use: No       SCREENINGS        Willy Coma Scale  Eye Opening: Spontaneous  Best Verbal Response: Oriented  Best Motor Response: Obeys commands  Pioneer Coma Scale Score: 15                CIWA Assessment  BP: (!) 168/91  Heart Rate: (!) 131           PHYSICAL EXAM  1 or more Elements     ED Triage Vitals [01/27/23 1816]   BP Temp Temp Source Heart Rate Resp SpO2 Height Weight   (!) 143/94 97.9 °F (36.6 °C) Oral (!) 104 18 94 % 5' 10\" (1.778 m) 175 lb (79.4 kg)       Physical Exam  Vitals and nursing note reviewed. Constitutional:       Appearance: Normal appearance. He is well-developed and normal weight. HENT:      Head: Normocephalic and atraumatic. Right Ear: External ear normal.      Left Ear: External ear normal.      Mouth/Throat:      Mouth: Mucous membranes are moist.      Pharynx: Oropharynx is clear. Eyes:      General: No scleral icterus. Right eye: No discharge. Left eye: No discharge. Conjunctiva/sclera: Conjunctivae normal.   Cardiovascular:      Rate and Rhythm: Regular rhythm. Tachycardia present. Heart sounds: Normal heart sounds. Pulmonary:      Effort: Pulmonary effort is normal.      Breath sounds: Normal breath sounds.    Abdominal: General: Abdomen is flat. Bowel sounds are normal.      Palpations: Abdomen is soft. Tenderness: There is no abdominal tenderness. Musculoskeletal:         General: Normal range of motion. Cervical back: Normal range of motion and neck supple. Right lower leg: No edema. Left lower leg: No edema. Skin:     General: Skin is warm and dry. Neurological:      General: No focal deficit present. Mental Status: He is alert and oriented to person, place, and time. Mental status is at baseline. Psychiatric:         Mood and Affect: Mood normal.         Behavior: Behavior normal.         Thought Content: Thought content normal.         Judgment: Judgment normal.         DIAGNOSTIC RESULTS   LABS:    Labs Reviewed   CBC WITH AUTO DIFFERENTIAL - Abnormal; Notable for the following components:       Result Value    WBC 13.4 (*)     Neutrophils Absolute 11.8 (*)     Lymphocytes Absolute 0.8 (*)     All other components within normal limits   COMPREHENSIVE METABOLIC PANEL W/ REFLEX TO MG FOR LOW K - Abnormal; Notable for the following components:    Sodium 135 (*)     Creatinine 0.6 (*)     AST 14 (*)     All other components within normal limits   URINALYSIS WITH REFLEX TO CULTURE - Abnormal; Notable for the following components:    Glucose, Ur >=1000 (*)     All other components within normal limits   BLOOD GAS, VENOUS - Abnormal; Notable for the following components:    pH, Hoang 7.307 (*)     pCO2, Hoang 53.9 (*)     pO2, Hoang 50.2 (*)     Carboxyhemoglobin 4.9 (*)     All other components within normal limits   POCT GLUCOSE - Abnormal; Notable for the following components:    POC Glucose 242 (*)     All other components within normal limits   CULTURE, BLOOD 1   CULTURE, BLOOD 2   TROPONIN   LIPASE   LACTIC ACID   TROPONIN   HEMOGLOBIN A1C   POCT GLUCOSE   POCT GLUCOSE   POCT GLUCOSE       When ordered only abnormal lab results are displayed.  All other labs were within normal range or not returned as of this dictation. EKG: When ordered, EKG's are interpreted by the Emergency Department Physician in the absence of a cardiologist.  Please see their note for interpretation of EKG. RADIOLOGY:   Non-plain film images such as CT, Ultrasound and MRI are read by the radiologist. Plain radiographic images are visualized and preliminarily interpreted by the ED Provider with the below findings:    Chest x-ray shows chronic changes without acute pathology. CT PE study obtained showing no evidence of PE or other acute cardiopulmonary abnormality. CT abdomen pelvis with IV contrast showing mild constipation without obstruction, mild prostamegaly, mild bibasilar atelectasis or early infiltrates posteriorly. Radiology suggesting follow-up with serial chest x-rays. Findings not discussed or noted on the CT chest PE study. Interpretation per the Radiologist below, if available at the time of this note:    CT ABDOMEN PELVIS W IV CONTRAST Additional Contrast? None   Final Result   Mild constipation with no obstruction. Mild prostatic enlargement with diffuse mild bladder wall thickening which   may just be due to poor distention. Recommend clinical follow-up      Mild bibasilar atelectasis or early infiltrates posteriorly. Suggest   follow-up with serial chest x-rays      Borderline hepatosplenomegaly with fatty replacement throughout the liver         CT CHEST PULMONARY EMBOLISM W CONTRAST   Final Result   No evidence of pulmonary embolism or acute pulmonary abnormality. XR CHEST PORTABLE   Final Result   Stable chronic changes with no acute abnormality seen. No results found. No results found. PROCEDURES   Unless otherwise noted below, none     Procedures    CRITICAL CARE TIME (.cctime)   Critical Care  There was a high probability of life-threatening clinical deterioration in the patient's condition requiring my urgent intervention.   Total critical care time with the patient was 35 minutes excluding separately reportable procedures. Critical care required due to patients finding of persistent tachycardia, hypoxemia and respiratory acidosis requiring oxygen replacement therapy. PAST MEDICAL HISTORY      has a past medical history of Chronic arm pain and Diabetes mellitus ([facility] [address] EMERGENCY DEPARTMENT COURSE and DIFFERENTIAL DIAGNOSIS/MDM:   Vitals:    Vitals:    01/28/23 0100 01/28/23 0115 01/28/23 0126 01/28/23 0215   BP:   (!) 168/91    Pulse: (!) 143 (!) 129 (!) 132 (!) 131   Resp: 26 (!) 32 28 28   Temp:       TempSrc:       SpO2: 97% 95% 95% 93%   Weight:       Height:           Patient was given the following medications:  Medications   dextrose 5 % and 0.9 % sodium chloride infusion (0 mLs IntraVENous Stopped 1/27/23 2028)   ondansetron (ZOFRAN) injection 4 mg (4 mg IntraVENous Given 1/27/23 1906)   famotidine (PEPCID) injection 20 mg (20 mg IntraVENous Given 1/27/23 2034)   ondansetron (ZOFRAN) injection 4 mg (4 mg IntraVENous Given 1/27/23 2034)   0.9 % sodium chloride bolus (0 mLs IntraVENous Stopped 1/27/23 2153)   iopamidol (ISOVUE-370) 76 % injection 75 mL (75 mLs IntraVENous Given 1/27/23 2204)             Is this patient to be included in the SEP-1 Core Measure due to severe sepsis or septic shock? No   Exclusion criteria - the patient is NOT to be included for SEP-1 Core Measure due to: Infection is not suspected    Chronic Conditions affecting care: Smoker diabetes   has a past medical history of Chronic arm pain and Diabetes mellitus ([facility] [address] CONSULTS: (Who and What was discussed)  IP CONSULT TO HOSPITALIST      Social Determinants : None    Records Reviewed (Source):     CC/HPI Summary, DDx, ED Course, and Reassessment: This patient presenting with complaint of nausea and belching. From a cardiac standpoint his troponin and BNP were normal.  Repeat troponin not elevated. Chest x-ray not showing evidence of cardiopulmonary abnormality.   Patient with persistent tachycardia and a chest CT PE study ordered showing no evidence of PE or other acute cardiopulmonary abnormality. Patient with persistent tachycardia of unclear reason. He did receive 2 L of fluid with continued tachycardia. VBG is obtained showing respiratory acidosis with a pH of 7.307 and a PCO2 of 53.9 and PO2 at 50.2. The patient was ambulated and desaturated down to 86%. At that point he was placed on O2 2 L and is maintaining O2 saturations ±95%. Patient also with a complaint of the nausea and belching abdominal pelvic CT scan showing mild constipation without concerning pathology. It was on the scan that there was mention of bibasilar posterior changes. This was not found to be evident on the chest CT PE study. This case is reviewed with attending physician is personally evaluated the patient and recommending admission with finding of acute respiratory failure with hypoxemia requiring oxygen replacement. Patient also with nausea and known diabetes without DKA. Patient does smoke and is clean early emergence of COPD. Disposition Considerations (tests considered but not done, Admit vs D/C, Shared Decision Making, Pt Expectation of Test or Tx.): The patient requires admission for further evaluation of acute respiratory failure and hypoxemia requiring oxygen replacement. Patient does smoke. This perhaps could be COPD related and he will need further evaluation and potential pulmonary consult. I did send [employer] note to hospitalist at 1:25 AM.      I am the Primary Clinician of Record. FINAL IMPRESSION      1. Acute respiratory failure with hypoxia ([address]    2. Nausea    3. Smoker          DISPOSITION/PLAN     DISPOSITION Admitted 01/28/2023 02:35:10 AM      PATIENT REFERRED TO:  No follow-up provider specified.     DISCHARGE MEDICATIONS:  New Prescriptions    No medications on file       DISCONTINUED MEDICATIONS:  Discontinued Medications    No medications on file              (Please note that portions of this note were completed with a voice recognition program.  Efforts were made to edit the dictations but occasionally words are mis-transcribed. )    Jaci Howe PA-C (electronically signed)        Jaci Howe PA-C  01/28/23 [id number]

## 2023-01-28 PROBLEM — R06.02 SOB (SHORTNESS OF BREATH): Status: ACTIVE | Noted: 2023-01-28

## 2023-01-28 PROBLEM — R11.2 NAUSEA & VOMITING: Status: ACTIVE | Noted: 2023-01-28

## 2023-01-28 PROBLEM — J18.9 PNA (PNEUMONIA): Status: ACTIVE | Noted: 2023-01-28

## 2023-01-28 PROBLEM — Z72.0 TOBACCO ABUSE: Status: ACTIVE | Noted: 2023-01-28

## 2023-01-28 LAB
EKG ATRIAL RATE: 99 BPM
EKG DIAGNOSIS: NORMAL
EKG P AXIS: 62 DEGREES
EKG P-R INTERVAL: 142 MS
EKG Q-T INTERVAL: 342 MS
EKG QRS DURATION: 82 MS
EKG QTC CALCULATION (BAZETT): 438 MS
EKG R AXIS: 1 DEGREES
EKG T AXIS: 77 DEGREES
EKG VENTRICULAR RATE: 99 BPM
ESTIMATED AVERAGE GLUCOSE: 191.5 MG/DL
GLUCOSE BLD-MCNC: 110 MG/DL (ref 70–99)
GLUCOSE BLD-MCNC: 141 MG/DL (ref 70–99)
GLUCOSE BLD-MCNC: 193 MG/DL (ref 70–99)
GLUCOSE BLD-MCNC: 228 MG/DL (ref 70–99)
GLUCOSE BLD-MCNC: 233 MG/DL (ref 70–99)
HBA1C MFR BLD: 8.3 %
PERFORMED ON: ABNORMAL
PROCALCITONIN: 0.21 NG/ML (ref 0–0.15)

## 2023-01-28 PROCEDURE — 1200000000 HC SEMI PRIVATE

## 2023-01-28 PROCEDURE — 36415 COLL VENOUS BLD VENIPUNCTURE: CPT

## 2023-01-28 PROCEDURE — 93010 ELECTROCARDIOGRAM REPORT: CPT | Performed by: INTERNAL MEDICINE

## 2023-01-28 PROCEDURE — 84145 PROCALCITONIN (PCT): CPT

## 2023-01-28 PROCEDURE — 83036 HEMOGLOBIN GLYCOSYLATED A1C: CPT

## 2023-01-28 PROCEDURE — 2700000000 HC OXYGEN THERAPY PER DAY

## 2023-01-28 PROCEDURE — 94761 N-INVAS EAR/PLS OXIMETRY MLT: CPT

## 2023-01-28 PROCEDURE — 2580000003 HC RX 258: Performed by: INTERNAL MEDICINE

## 2023-01-28 PROCEDURE — 6370000000 HC RX 637 (ALT 250 FOR IP): Performed by: INTERNAL MEDICINE

## 2023-01-28 PROCEDURE — 87040 BLOOD CULTURE FOR BACTERIA: CPT

## 2023-01-28 PROCEDURE — 6360000002 HC RX W HCPCS: Performed by: INTERNAL MEDICINE

## 2023-01-28 RX ORDER — SODIUM CHLORIDE 0.9 % (FLUSH) 0.9 %
5-40 SYRINGE (ML) INJECTION PRN
Status: DISCONTINUED | OUTPATIENT
Start: 2023-01-28 | End: 2023-01-30 | Stop reason: HOSPADM

## 2023-01-28 RX ORDER — LEVOFLOXACIN 5 MG/ML
750 INJECTION, SOLUTION INTRAVENOUS EVERY 24 HOURS
Status: DISCONTINUED | OUTPATIENT
Start: 2023-01-28 | End: 2023-01-30 | Stop reason: HOSPADM

## 2023-01-28 RX ORDER — INSULIN LISPRO 100 [IU]/ML
0-4 INJECTION, SOLUTION INTRAVENOUS; SUBCUTANEOUS NIGHTLY
Status: DISCONTINUED | OUTPATIENT
Start: 2023-01-28 | End: 2023-01-30 | Stop reason: HOSPADM

## 2023-01-28 RX ORDER — DEXTROSE MONOHYDRATE 100 MG/ML
INJECTION, SOLUTION INTRAVENOUS CONTINUOUS PRN
Status: DISCONTINUED | OUTPATIENT
Start: 2023-01-28 | End: 2023-01-30 | Stop reason: HOSPADM

## 2023-01-28 RX ORDER — POLYETHYLENE GLYCOL 3350 17 G/17G
17 POWDER, FOR SOLUTION ORAL DAILY PRN
Status: DISCONTINUED | OUTPATIENT
Start: 2023-01-28 | End: 2023-01-30 | Stop reason: HOSPADM

## 2023-01-28 RX ORDER — INSULIN LISPRO 100 [IU]/ML
0-16 INJECTION, SOLUTION INTRAVENOUS; SUBCUTANEOUS
Status: DISCONTINUED | OUTPATIENT
Start: 2023-01-28 | End: 2023-01-30 | Stop reason: HOSPADM

## 2023-01-28 RX ORDER — SODIUM CHLORIDE 9 MG/ML
INJECTION, SOLUTION INTRAVENOUS CONTINUOUS
Status: DISCONTINUED | OUTPATIENT
Start: 2023-01-28 | End: 2023-01-30 | Stop reason: HOSPADM

## 2023-01-28 RX ORDER — SODIUM CHLORIDE 9 MG/ML
INJECTION, SOLUTION INTRAVENOUS PRN
Status: DISCONTINUED | OUTPATIENT
Start: 2023-01-28 | End: 2023-01-30 | Stop reason: HOSPADM

## 2023-01-28 RX ORDER — ONDANSETRON 4 MG/1
4 TABLET, ORALLY DISINTEGRATING ORAL EVERY 8 HOURS PRN
Status: DISCONTINUED | OUTPATIENT
Start: 2023-01-28 | End: 2023-01-30 | Stop reason: HOSPADM

## 2023-01-28 RX ORDER — ACETAMINOPHEN 650 MG/1
650 SUPPOSITORY RECTAL EVERY 6 HOURS PRN
Status: DISCONTINUED | OUTPATIENT
Start: 2023-01-28 | End: 2023-01-30 | Stop reason: HOSPADM

## 2023-01-28 RX ORDER — SODIUM CHLORIDE 0.9 % (FLUSH) 0.9 %
5-40 SYRINGE (ML) INJECTION EVERY 12 HOURS SCHEDULED
Status: DISCONTINUED | OUTPATIENT
Start: 2023-01-28 | End: 2023-01-30 | Stop reason: HOSPADM

## 2023-01-28 RX ORDER — INSULIN GLARGINE 100 [IU]/ML
40 INJECTION, SOLUTION SUBCUTANEOUS ONCE
Status: COMPLETED | OUTPATIENT
Start: 2023-01-28 | End: 2023-01-28

## 2023-01-28 RX ORDER — ACETAMINOPHEN 325 MG/1
650 TABLET ORAL EVERY 6 HOURS PRN
Status: DISCONTINUED | OUTPATIENT
Start: 2023-01-28 | End: 2023-01-30 | Stop reason: HOSPADM

## 2023-01-28 RX ORDER — INSULIN GLARGINE 100 [IU]/ML
64 INJECTION, SOLUTION SUBCUTANEOUS EVERY EVENING
Status: DISCONTINUED | OUTPATIENT
Start: 2023-01-28 | End: 2023-01-28

## 2023-01-28 RX ORDER — ENOXAPARIN SODIUM 100 MG/ML
40 INJECTION SUBCUTANEOUS DAILY
Status: DISCONTINUED | OUTPATIENT
Start: 2023-01-28 | End: 2023-01-30 | Stop reason: HOSPADM

## 2023-01-28 RX ORDER — IPRATROPIUM BROMIDE AND ALBUTEROL SULFATE 2.5; .5 MG/3ML; MG/3ML
1 SOLUTION RESPIRATORY (INHALATION) EVERY 4 HOURS PRN
Status: DISCONTINUED | OUTPATIENT
Start: 2023-01-28 | End: 2023-01-30 | Stop reason: HOSPADM

## 2023-01-28 RX ORDER — ONDANSETRON 2 MG/ML
4 INJECTION INTRAMUSCULAR; INTRAVENOUS EVERY 6 HOURS PRN
Status: DISCONTINUED | OUTPATIENT
Start: 2023-01-28 | End: 2023-01-30 | Stop reason: HOSPADM

## 2023-01-28 RX ORDER — INSULIN GLARGINE 100 [IU]/ML
80 INJECTION, SOLUTION SUBCUTANEOUS NIGHTLY
COMMUNITY

## 2023-01-28 RX ORDER — IPRATROPIUM BROMIDE AND ALBUTEROL SULFATE 2.5; .5 MG/3ML; MG/3ML
1 SOLUTION RESPIRATORY (INHALATION)
Status: DISCONTINUED | OUTPATIENT
Start: 2023-01-28 | End: 2023-01-28

## 2023-01-28 RX ORDER — INSULIN GLARGINE 100 [IU]/ML
80 INJECTION, SOLUTION SUBCUTANEOUS EVERY EVENING
Status: DISCONTINUED | OUTPATIENT
Start: 2023-01-28 | End: 2023-01-30 | Stop reason: HOSPADM

## 2023-01-28 RX ADMIN — INSULIN LISPRO 4 UNITS: 100 INJECTION, SOLUTION INTRAVENOUS; SUBCUTANEOUS at 09:14

## 2023-01-28 RX ADMIN — LEVOFLOXACIN 750 MG: 5 INJECTION, SOLUTION INTRAVENOUS at 05:18

## 2023-01-28 RX ADMIN — INSULIN GLARGINE 40 UNITS: 100 INJECTION, SOLUTION SUBCUTANEOUS at 05:16

## 2023-01-28 RX ADMIN — INSULIN GLARGINE 80 UNITS: 100 INJECTION, SOLUTION SUBCUTANEOUS at 18:21

## 2023-01-28 RX ADMIN — SODIUM CHLORIDE: 9 INJECTION, SOLUTION INTRAVENOUS at 16:02

## 2023-01-28 RX ADMIN — ENOXAPARIN SODIUM 40 MG: 100 INJECTION SUBCUTANEOUS at 09:03

## 2023-01-28 RX ADMIN — SODIUM CHLORIDE: 9 INJECTION, SOLUTION INTRAVENOUS at 05:13

## 2023-01-28 NOTE — PROGRESS NOTES
Pt resting quietly in bed all morning. Pt denies presence of any pain. Pt denies any nausea and has finished the majority of breakfast, minimal lunch. Pt ambulating independently and calls out appropriately, A/O x4. Standard safety measures in place and gripper socks in use. Bedside table and call light within reach.

## 2023-01-28 NOTE — PROGRESS NOTES
4 Eyes Skin Assessment     The patient is being assess for  Admission    I agree that 2 RN's have performed a thorough Head to Toe Skin Assessment on the patient. ALL assessment sites listed below have been assessed. Areas assessed by both nurses: Familia Marley and Adin Vargas   [x]   Head, Face, and Ears   [x]   Shoulders, Back, and Chest  [x]   Arms, Elbows, and Hands   [x]   Coccyx, Sacrum, and IschIum  [x]   Legs, Feet, and Heels        Does the Patient have Skin Breakdown?   No         Cirilo Prevention initiated:  No   Wound Care Orders initiated:  No      [facility] nurse consulted for Pressure Injury (Stage 3,4, Unstageable, DTI, NWPT, and Complex wounds), New and Established Ostomies:  No      Nurse 1 eSignature: Electronically signed by Elizabet Harper on 1/28/23 at 7:11 AM EST    **SHARE this note so that the co-signing nurse is able to place an eSignature**    Nurse 2 eSignature: {Esignature:[id number]}

## 2023-01-28 NOTE — ED PROVIDER NOTES
Emergency Department Provider Note     Location: [address] [place] [address]  ED  1/27/2023    I independently performed a history and physical on Velma Miller. All diagnostic, treatment, and disposition decisions were made by myself in conjunction with the mid-level provider. Velma Miller is a 45 y.o. male here for abdominal pain, nausea. Patient said after drinking a cup of coffee this morning, he did not feel good. As the day went on, he developed more more generalized abdominal distention with nausea. He reports very minimal abdominal pain. Patient thought maybe he was in DKA. He feels like he needs to burp but cannot. He has not had a bowel movement today. Last bowel movement was yesterday and he thought it was normal.  Denies bloody stool. He said he did not really vomit today but has pretty significant anorexia. No fever. No cough or congestion or body ache. No other complaints, modifying factors or associated symptoms. ED Triage Vitals [01/27/23 1816]   BP Temp Temp Source Heart Rate Resp SpO2 Height Weight   (!) 143/94 97.9 °F (36.6 °C) Oral (!) 104 18 94 % 5' 10\" (1.778 m) 175 lb (79.4 kg)        Exam showed WDWN male awake, alert, no facial droop, no slurred speech, heart tachycardic but regular, abdomen soft, slight distention no tenderness. No edema of the lower extremities. Patient seen and examined in room 20    EKG  The Ekg interpreted by me in the absence of a cardiologist shows. normal sinus rhythm with a rate of 99  Axis is   Normal  QTc is  normal  Intervals and Durations are unremarkable. No specific ST-T wave changes appreciated. No evidence of acute ischemia. Compared to prior EKG dated January 13, 2022, patient is less tachycardic today. .  See the EKG shows sinus tachycardia with a heart rate of 139.       Radiology  CT ABDOMEN PELVIS W IV CONTRAST Additional Contrast? None    Result Date: 1/27/2023  EXAMINATION: CT OF THE ABDOMEN AND PELVIS WITH CONTRAST 1/27/2023 9:49 pm TECHNIQUE: CT of the abdomen and pelvis was performed with the administration of intravenous contrast. Multiplanar reformatted images are provided for review. Automated exposure control, iterative reconstruction, and/or weight based adjustment of the mA/kV was utilized to reduce the radiation dose to as low as reasonably achievable. COMPARISON: None. HISTORY: ORDERING SYSTEM PROVIDED HISTORY: abdominal distention. nausea, tachycardia, anorexia, no BM today TECHNOLOGIST PROVIDED HISTORY: Additional Contrast?->None Reason for exam:->abdominal distention. nausea, tachycardia, anorexia, no BM today Reason for Exam: Abd bloated n/v Relevant Medical/Surgical History: Appy FINDINGS: Lower Chest: There are hazy subpleural interstitial and ground-glass opacities along the lung bases posterolaterally. Organs: The liver and spleen are borderline enlarged with fatty replacement throughout the liver. The gallbladder, pancreas, and bile ducts are normal. The adrenals are normal.  The kidneys are normal size and function normally with no hydronephrosis, renal stone, or mass. The ureters are normal caliber. GI/Bowel: The appendix is not well visualized with no pericecal inflammation. There is mild feces scattered in the colon. The small bowel is normal caliber with no bowel wall thickening and the mesentery is unremarkable. Pelvis: The bladder is not well distended and there is diffuse mild bladder wall thickening. The prostate gland is mildly enlarged. There is no adenopathy or ascites Peritoneum/Retroperitoneum: There is mild calcified plaque throughout the aorta which is normal caliber with no aneurysm or dissection and no retroperitoneal mass or adenopathy is seen. Bones/Soft Tissues: There are mild degenerative changes throughout the spine with no aggressive osseous lesion. Mild constipation with no obstruction.  Mild prostatic enlargement with diffuse mild bladder wall thickening which may just be due to poor distention. Recommend clinical follow-up Mild bibasilar atelectasis or early infiltrates posteriorly. Suggest follow-up with serial chest x-rays Borderline hepatosplenomegaly with fatty replacement throughout the liver     XR CHEST PORTABLE    Result Date: 1/27/2023  EXAMINATION: ONE XRAY VIEW OF THE CHEST 1/27/2023 6:50 pm COMPARISON: 01/13/2022 HISTORY: ORDERING SYSTEM PROVIDED HISTORY: Low blood sugar, nausea TECHNOLOGIST PROVIDED HISTORY: Reason for exam:->Low blood sugar, nausea Reason for Exam: low blood sugar, nausea FINDINGS: The heart is normal.  The pulmonary vessels are normal.  The lungs are mildly hyperinflated and emphysematous. There are mild chronic interstitial changes throughout. No consolidation or effusion is seen. The bones are intact     Stable chronic changes with no acute abnormality seen. CT CHEST PULMONARY EMBOLISM W CONTRAST    Result Date: 1/27/2023  EXAMINATION: CTA OF THE CHEST 1/27/2023 9:49 pm TECHNIQUE: CTA of the chest was performed after the administration of intravenous contrast.  Multiplanar reformatted images are provided for review. MIP images are provided for review. Automated exposure control, iterative reconstruction, and/or weight based adjustment of the mA/kV was utilized to reduce the radiation dose to as low as reasonably achievable. COMPARISON: None. HISTORY: ORDERING SYSTEM PROVIDED HISTORY: tachycardia, hypoxia, nausea TECHNOLOGIST PROVIDED HISTORY: Reason for exam:->tachycardia, hypoxia, nausea Decision Support Exception - unselect if not a suspected or confirmed emergency medical condition->Emergency Medical Condition (MA) Reason for Exam: tachycardia Relevant Medical/Surgical History: no surgery FINDINGS: Pulmonary Arteries: Pulmonary arteries are adequately opacified for evaluation. No evidence of intraluminal filling defect to suggest pulmonary embolism. Main pulmonary artery is normal in caliber.  Mediastinum: No evidence of mediastinal lymphadenopathy. The heart and pericardium demonstrate no acute abnormality. There is no acute abnormality of the thoracic aorta. Lungs/pleura: The lungs are without acute process. There is chronic pulmonary change. No focal consolidation or pulmonary edema. No evidence of pleural effusion or pneumothorax. Upper Abdomen: Limited images of the upper abdomen are unremarkable. Soft Tissues/Bones: No acute bone or soft tissue abnormality. No evidence of pulmonary embolism or acute pulmonary abnormality.         Labs  Results for orders placed or performed during the hospital encounter of 01/27/23   CBC with Auto Differential   Result Value Ref Range    WBC 13.4 (H) 4.0 - 11.0 K/uL    RBC 4.97 4.20 - 5.90 M/uL    Hemoglobin 15.3 13.5 - 17.5 g/dL    Hematocrit 45.7 40.5 - 52.5 %    MCV 91.9 80.0 - 100.0 fL    MCH 30.9 26.0 - 34.0 pg    MCHC 33.6 31.0 - 36.0 g/dL    RDW 14.3 12.4 - 15.4 %    Platelets 739 332 - 722 K/uL    MPV 6.7 5.0 - 10.5 fL    Neutrophils % 87.5 %    Lymphocytes % 5.9 %    Monocytes % 5.9 %    Eosinophils % 0.3 %    Basophils % 0.4 %    Neutrophils Absolute 11.8 (H) 1.7 - 7.7 K/uL    Lymphocytes Absolute 0.8 (L) 1.0 - 5.1 K/uL    Monocytes Absolute 0.8 0.0 - 1.3 K/uL    Eosinophils Absolute 0.0 0.0 - 0.6 K/uL    Basophils Absolute 0.0 0.0 - 0.2 K/uL   CMP w/ Reflex to MG   Result Value Ref Range    Sodium 135 (L) 136 - 145 mmol/L    Potassium reflex Magnesium 4.0 3.5 - 5.1 mmol/L    Chloride 99 99 - 110 mmol/L    CO2 24 21 - 32 mmol/L    Anion Gap 12 3 - 16    Glucose 77 70 - 99 mg/dL    BUN 17 7 - 20 mg/dL    Creatinine 0.6 (L) 0.9 - 1.3 mg/dL    Est, Glom Filt Rate >60 >60    Calcium 9.0 8.3 - 10.6 mg/dL    Total Protein 6.8 6.4 - 8.2 g/dL    Albumin 4.5 3.4 - 5.0 g/dL    Albumin/Globulin Ratio 2.0 1.1 - 2.2    Total Bilirubin 0.6 0.0 - 1.0 mg/dL    Alkaline Phosphatase 110 40 - 129 U/L    ALT 17 10 - 40 U/L    AST 14 (L) 15 - 37 U/L   Troponin   Result Value Ref Range    Troponin <0.01 <0.01 ng/mL   Lipase   Result Value Ref Range    Lipase 23.0 13.0 - 60.0 U/L   POCT Glucose   Result Value Ref Range    POC Glucose 74 70 - 99 mg/dl    Performed on ACCU-CHEK    POCT Glucose   Result Value Ref Range    POC Glucose 242 (H) 70 - 99 mg/dl    Performed on ACCU-CHEK    EKG 12 Lead   Result Value Ref Range    Ventricular Rate 99 BPM    Atrial Rate 99 BPM    P-R Interval 142 ms    QRS Duration 82 ms    Q-T Interval 342 ms    QTc Calculation (Bazett) 438 ms    P Axis 62 degrees    R Axis 1 degrees    T Axis 77 degrees    Diagnosis       Normal sinus rhythmNormal ECGWhen compared with ECG of 13-JAN-2022 18:25,No significant change was found         - Patient seen and evaluated in room 21.  45 y.o. male presented for nausea, abdominal distention. Exam shows slightly distended abdomen but otherwise nontender.  - Patient thought he was in DKA but glucose normal.  He also does not have an anion gap or depressed bicarb. No concern for DKA at this point in time. - patient is tachycardic on exam. EKG showed sinus tachycardia. Given that O2 sat 90-95% range, will perform CTPE study as we do not have clear explanation of this tachycardia at this time. - Patient was placed on telemetry during his/her ED stay and no malignant dysrhythmia observed. - Pertinent old records reviewed; specifically office/clinic visits reviewed for baseline HR. Patient tends to have borderline high HR but also has a few documented HR in the 80s.       - Patient was given    ED Medication Orders (From admission, onward)      Start Ordered     Status Ordering Provider    01/27/23 2030 01/27/23 2015  famotidine (PEPCID) injection 20 mg  ONCE         Last MAR action: Given - by José Bryant on 01/27/23 at [address] E    01/27/23 2030 01/27/23 2015  ondansetron (ZOFRAN) injection 4 mg  ONCE         Last MAR action: Given - by José Bryant on 01/27/23 at 2034 Nora Castillo E    01/27/23 2030 01/27/23 2016  0.9 % sodium chloride bolus  ONCE         Last MAR action: New Bag - by Sheree Sees on 01/27/23 at 2034 Tay Pineda E    01/27/23 1900 01/27/23 1848  dextrose 5 % and 0.9 % sodium chloride infusion  ONCE         Last MAR action: Stopped - by Sheree Sees on 01/27/23 at 2028 Tay Pineda E    01/27/23 1900 01/27/23 1849  ondansetron (ZOFRAN) injection 4 mg  ONCE         Last MAR action: Given - by Sheree Sees on 01/27/23 at [address], [address]              Patient noted to be hypoxic in the ED and workup did not find a clear reason for the acute respiratory failure. Patient will need admission for further workup. - Is this patient to be included in the SEP-1 Core Measure due to severe sepsis or septic shock? No   Exclusion criteria - the patient is NOT to be included for SEP-1 Core Measure due to: Infection is not suspected    Clinical Impression:  1. Acute respiratory failure with hypoxia ([address]    2. Nausea    3. Smoker          Disposition:  Admit to telemetry in stable condition. Blood pressure (!) 146/98, pulse (!) 110, temperature 97.9 °F (36.6 °C), temperature source Oral, resp. rate 18, height 5' 10\" (1.778 m), weight 175 lb (79.4 kg), SpO2 99 %. For further details of [facility] [place] emergency department encounter, please see ALBANIA Hubbard's documentation. [place], am the primary attending of record and performed a substantive portion of the visit including all aspects of the medical decision making. I personally saw the patient and independently provided 20 minutes of non-concurrent critical care out of the total shared critical care time provided. The critical care time excludes separately billable procedures and updating family. Time spent is specifically for management of the presenting complaint and symptoms initially, direct bedside care, reevaluation, review of records, and consultation.   There was a high probability of clinically significant life-threatening deterioration in the patient's condition, which required my urgent intervention.      This chart was generated in part by using Dragon Dictation system and may contain errors related to that system including errors in grammar, punctuation, and spelling, as well as words and phrases that may be inappropriate. If there are any questions or concerns please feel free to contact the dictating provider for clarification.         Bret Kent MD  01/28/23 0846

## 2023-01-28 NOTE — ED NOTES
Patient ambulated to bathroom and back to bed with steady gait. O2 saturation 86-90% while ambulating.       Melanie Urrutia RN  01/27/23 0624

## 2023-01-28 NOTE — PROGRESS NOTES
01/28/23 0755   RT Protocol   History Pulmonary Disease 1   Respiratory pattern 0   Breath sounds 0   Cough 0   Indications for Bronchodilator Therapy None   Bronchodilator Assessment Score 1

## 2023-01-28 NOTE — H&P
Hospital Medicine History & Physical      PCP: Referring Not In System (Inactive)    Date of Admission: 1/27/2023    Date of Service: Pt seen/examined on 1/28/2023 and Admitted to Inpatient with expected LOS greater than two midnights due to medical therapy. Chief Complaint: Nausea vomiting      History Of Present Illness:    45 y.o. male who presented to [facility] with above complaints  Patient with PMH of DM2, tobacco abuse presented to the ED with complaints of nausea and vomiting. Patient reports he was at work last night he works night shift. He began to feel suddenly bloated in his stomach, accompanied by nausea and vomiting and belching. Denied any abdominal pain. Patient felt like he was in ketosis. Denied any fevers or chills. He has a chronic cough and brings up minimal phlegm. Denied any shortness of breath. He smokes half a pack of cigarettes every day. Reports mild diarrhea. No dysuria urgency or frequency. Denies any alcohol intake. Patient was tachycardic and hypoxic in the ED requiring 2 L/min oxygen placement. Past Medical History:          Diagnosis Date    Chronic arm pain     Diabetes mellitus ([address]        Past Surgical History:          Procedure Laterality Date    APPENDECTOMY         Medications Prior to Admission:      Prior to Admission medications    Medication Sig Start Date End Date Taking? Authorizing Provider   metFORMIN (GLUCOPHAGE) 500 MG tablet Take 1 tablet by mouth in the morning and 1 tablet in the evening. Take with meals. Do all this for 14 days. 8/14/22 8/28/22  Kimmy Haque PA-C   Insulin Glargine, 2 Unit Dial, (TOUJEO DEMETRIUS SOLOSTAR) 300 UNIT/ML SOPN Inject 95 Units into the skin every evening for 14 days 8/14/22 8/28/22  Kimmy Haque PA-C   insulin lispro (HUMALOG) 100 UNIT/ML injection vial Inject  into the skin 3 times daily (before meals).  ssi    Historical Provider, MD   ibuprofen (ADVIL;MOTRIN) 600 MG tablet Take 1 tablet by mouth every 6 hours as needed for Pain. Patient not taking: No sig reported 9/21/14   Antonio Livingston MD       Allergies:  Patient has no known allergies. Social History:      The patient currently lives above complaints    TOBACCO:   reports that he has been smoking cigarettes. He has been smoking an average of .5 packs per day. He does not have any smokeless tobacco history on file. ETOH:   reports no history of alcohol use. E-cigarette/Vaping       Questions Responses    E-cigarette/Vaping Use     Start Date     Passive Exposure     Quit Date     Counseling Given     Comments               Family History:       Reviewed and negative in regards to presenting illness/complaint. History reviewed. No pertinent family history. REVIEW OF SYSTEMS COMPLETED:   Pertinent positives as noted in the HPI. All other systems reviewed and negative. PHYSICAL EXAM PERFORMED:    /85   Pulse (!) 137   Temp 98.9 °F (37.2 °C) (Oral)   Resp 18   Ht 5' 10\" (1.778 m)   Wt 176 lb 3.2 oz (79.9 kg)   SpO2 93%   BMI 25.28 kg/m²     General appearance:  No apparent distress, appears stated age and cooperative. HEENT:  Normal cephalic, atraumatic without obvious deformity. Pupils equal, round, and reactive to light. Extra ocular muscles intact. Conjunctivae/corneas clear. Neck: Supple, with full range of motion. No jugular venous distention. Trachea midline. Respiratory:  Normal respiratory effort. Clear to auscultation, bilaterally without Rales/Wheezes/Rhonchi. Cardiovascular: Tachycardic, regular  rhythm with normal S1/S2 without murmurs, rubs or gallops. Abdomen: Soft, non-tender, non-distended with normal bowel sounds. Musculoskeletal:  No clubbing, cyanosis or edema bilaterally. Full range of motion without deformity. Skin: Skin color, texture, turgor normal.  No rashes or lesions. Neurologic:  Neurovascularly intact without any focal sensory/motor deficits.  Cranial nerves: II-XII intact, grossly non-focal.  Psychiatric:  Alert and oriented, thought content appropriate, normal insight  Capillary Refill: Brisk,3 seconds, normal  Peripheral Pulses: +2 palpable, equal bilaterally       Labs:     Recent Labs     01/27/23 1900   WBC 13.4*   HGB 15.3   HCT 45.7        Recent Labs     01/27/23 1900   *   K 4.0   CL 99   CO2 24   BUN 17   CREATININE 0.6*   CALCIUM 9.0     Recent Labs     01/27/23 1900   AST 14*   ALT 17   BILITOT 0.6   ALKPHOS 110     No results for input(s): INR in the last 72 hours. Recent Labs     01/27/23 1900 01/27/23  2317   TROPONINI <0.01 <0.01       Urinalysis:      Lab Results   Component Value Date/Time    NITRU Negative 01/27/2023 10:13 PM    BLOODU Negative 01/27/2023 10:13 PM    SPECGRAV 1.020 01/27/2023 10:13 PM    GLUCOSEU >=1000 01/27/2023 10:13 PM       Radiology:     I personally reviewed the CT abdomen pelvis, CT chest and chest x-ray. He has mild bibasilar opacities could be atelectasis versus infiltrates, fatty liver. No acute PE      EKG:  I have reviewed the EKG with the following interpretation: NSR, rate 89, no acute ischemic changes    CT ABDOMEN PELVIS W IV CONTRAST Additional Contrast? None   Final Result   Mild constipation with no obstruction. Mild prostatic enlargement with diffuse mild bladder wall thickening which   may just be due to poor distention. Recommend clinical follow-up      Mild bibasilar atelectasis or early infiltrates posteriorly. Suggest   follow-up with serial chest x-rays      Borderline hepatosplenomegaly with fatty replacement throughout the liver         CT CHEST PULMONARY EMBOLISM W CONTRAST   Final Result   No evidence of pulmonary embolism or acute pulmonary abnormality. XR CHEST PORTABLE   Final Result   Stable chronic changes with no acute abnormality seen.              Consults:    IP CONSULT TO HOSPITALIST    ASSESSMENT:PLAN:    Active Hospital Problems    Diagnosis Date Noted    SOB (shortness of breath) [R06.02] 01/28/2023     Priority: Medium    PNA (pneumonia) [J18.9] 01/28/2023     Priority: Medium    Nausea & vomiting [R11.2] 01/28/2023     Priority: Medium    Tobacco abuse [Z72.0] 01/28/2023     Priority: Medium     Sepsis due to early pneumonia [CAP]  Tachycardia, leukocytosis, procalcitonin elevated  ABG showing mild respiratory acidosis and hypoxemia  CT suspicious for early bilateral bibasilar pneumonia, patient does have wet sounding cough, chronic smoker. I suspect his presentation is due to early pneumonia. Possible underlying COPD  -Start IV Levaquin. Sputum culture if obtainable  -Start DuoNebs every 4 hours  -IV fluids  -Monitor on telemetry for persistent tachycardia  -CTA chest showed no acute PE, no evidence of acute MI on EKG/troponin, CT abdomen unremarkable  -Lactic <2    Hypoxemia-likely due to CAP, chronic smoker. Placed on 2 LPM oxygen, wean as tolerated to maintain sats >88%    Acute nausea/vomiting -unclear etiology. CT abdomen benign. Symptoms have resolved. No evidence of acute pancreatitis    DM2 -controlled, last A1c 7.7 in 2022. Repeat A1c  Resume Lantus 80 units nightly. High-dose SSI ordered, Accu-Cheks, hypoglycemia protocol ordered    Tobacco abuse-counseled cessation, offered nicotine RT, patient defers    DVT Prophylaxis: Lovenox  Diet: ADULT DIET; Regular; 4 carb choices (60 gm/meal)  Code Status: Full Code    PT/OT Eval Status: Ambulatory    Dispo -IP stay       Breanne Gasca MD    Thank you Referring Not In System (Inactive) for the opportunity to be involved in this patient's care. If you have any questions or concerns please feel free to contact me at [phone].

## 2023-01-28 NOTE — PROGRESS NOTES
Pt admitted to 334 with chief complaint of SOB. Pt oriented to room. Vitals and shift head to toe completed. Dyspneic with exertion. Lung sounds diminished. Denies needs at this time. Oriented to call bell.

## 2023-01-28 NOTE — RT PROTOCOL NOTE
RT Inhaler-Nebulizer Bronchodilator Protocol Note    There is a bronchodilator order in the chart from a provider indicating to follow the RT Bronchodilator Protocol and there is an Initiate RT Inhaler-Nebulizer Bronchodilator Protocol order as well (see protocol at bottom of note). CXR Findings:  XR CHEST PORTABLE    Result Date: 1/27/2023  Stable chronic changes with no acute abnormality seen. The findings from the last RT Protocol Assessment were as follows:   History Pulmonary Disease: Smoker 15 pack years or more  Respiratory Pattern: Regular pattern and RR 12-20 bpm  Breath Sounds: Clear breath sounds  Cough: Strong, spontaneous, non-productive  Indication for Bronchodilator Therapy: None  Bronchodilator Assessment Score: 1    Aerosolized bronchodilator medication orders have been revised according to the RT Inhaler-Nebulizer Bronchodilator Protocol below. Respiratory Therapist to perform RT Therapy Protocol Assessment initially then follow the protocol. Repeat RT Therapy Protocol Assessment PRN for score 0-3 or on second treatment, BID, and PRN for scores above 3. No Indications - adjust the frequency to every 6 hours PRN wheezing or bronchospasm, if no treatments needed after 48 hours then discontinue using Per Protocol order mode. If indication present, adjust the RT bronchodilator orders based on the Bronchodilator Assessment Score as indicated below. Use Inhaler orders unless patient has one or more of the following: on home nebulizer, not able to hold breath for 10 seconds, is not alert and oriented, cannot activate and use MDI correctly, or respiratory rate 25 breaths per minute or more, then use the equivalent nebulizer order(s) with same Frequency and PRN reasons based on the score. If a patient is on this medication at home then do not decrease Frequency below that used at home.     0-3 - enter or revise RT bronchodilator order(s) to equivalent RT Bronchodilator order with Frequency of every 4 hours PRN for wheezing or increased work of breathing using Per Protocol order mode. 4-6 - enter or revise RT Bronchodilator order(s) to two equivalent RT bronchodilator orders with one order with BID Frequency and one order with Frequency of every 4 hours PRN wheezing or increased work of breathing using Per Protocol order mode. 7-10 - enter or revise RT Bronchodilator order(s) to two equivalent RT bronchodilator orders with one order with TID Frequency and one order with Frequency of every 4 hours PRN wheezing or increased work of breathing using Per Protocol order mode. 11-13 - enter or revise RT Bronchodilator order(s) to one equivalent RT bronchodilator order with QID Frequency and an Albuterol order with Frequency of every 4 hours PRN wheezing or increased work of breathing using Per Protocol order mode. Greater than 13 - enter or revise RT Bronchodilator order(s) to one equivalent RT bronchodilator order with every 4 hours Frequency and an Albuterol order with Frequency of every 2 hours PRN wheezing or increased work of breathing using Per Protocol order mode. RT to enter RT Home Evaluation for COPD & MDI Assessment order using Per Protocol order mode.     Electronically signed by Silvano Elaine RCP on 1/28/2023 at 7:56 AM

## 2023-01-29 LAB
A/G RATIO: 1.8 (ref 1.1–2.2)
ALBUMIN SERPL-MCNC: 3.3 G/DL (ref 3.4–5)
ALP BLD-CCNC: 112 U/L (ref 40–129)
ALT SERPL-CCNC: 13 U/L (ref 10–40)
ANION GAP SERPL CALCULATED.3IONS-SCNC: 6 MMOL/L (ref 3–16)
AST SERPL-CCNC: 11 U/L (ref 15–37)
BASOPHILS ABSOLUTE: 0 K/UL (ref 0–0.2)
BASOPHILS RELATIVE PERCENT: 0.3 %
BILIRUB SERPL-MCNC: <0.2 MG/DL (ref 0–1)
BUN BLDV-MCNC: 14 MG/DL (ref 7–20)
CALCIUM SERPL-MCNC: 8.3 MG/DL (ref 8.3–10.6)
CHLORIDE BLD-SCNC: 104 MMOL/L (ref 99–110)
CO2: 26 MMOL/L (ref 21–32)
CREAT SERPL-MCNC: 0.7 MG/DL (ref 0.9–1.3)
EOSINOPHILS ABSOLUTE: 0.1 K/UL (ref 0–0.6)
EOSINOPHILS RELATIVE PERCENT: 0.8 %
ESTIMATED AVERAGE GLUCOSE: 185.8 MG/DL
ESTIMATED AVERAGE GLUCOSE: 185.8 MG/DL
GFR SERPL CREATININE-BSD FRML MDRD: >60 ML/MIN/{1.73_M2}
GLUCOSE BLD-MCNC: 204 MG/DL (ref 70–99)
GLUCOSE BLD-MCNC: 207 MG/DL (ref 70–99)
GLUCOSE BLD-MCNC: 229 MG/DL (ref 70–99)
GLUCOSE BLD-MCNC: 243 MG/DL (ref 70–99)
GLUCOSE BLD-MCNC: 58 MG/DL (ref 70–99)
GLUCOSE BLD-MCNC: 95 MG/DL (ref 70–99)
HBA1C MFR BLD: 8.1 %
HBA1C MFR BLD: 8.1 %
HCT VFR BLD CALC: 37.6 % (ref 40.5–52.5)
HEMOGLOBIN: 12.9 G/DL (ref 13.5–17.5)
LYMPHOCYTES ABSOLUTE: 1.2 K/UL (ref 1–5.1)
LYMPHOCYTES RELATIVE PERCENT: 18.7 %
MCH RBC QN AUTO: 31.3 PG (ref 26–34)
MCHC RBC AUTO-ENTMCNC: 34.2 G/DL (ref 31–36)
MCV RBC AUTO: 91.6 FL (ref 80–100)
MONOCYTES ABSOLUTE: 0.5 K/UL (ref 0–1.3)
MONOCYTES RELATIVE PERCENT: 7.3 %
NEUTROPHILS ABSOLUTE: 4.8 K/UL (ref 1.7–7.7)
NEUTROPHILS RELATIVE PERCENT: 72.9 %
PDW BLD-RTO: 14.1 % (ref 12.4–15.4)
PERFORMED ON: ABNORMAL
PERFORMED ON: NORMAL
PLATELET # BLD: 209 K/UL (ref 135–450)
PMV BLD AUTO: 7 FL (ref 5–10.5)
POTASSIUM SERPL-SCNC: 4.3 MMOL/L (ref 3.5–5.1)
PROCALCITONIN: 0.15 NG/ML (ref 0–0.15)
RBC # BLD: 4.11 M/UL (ref 4.2–5.9)
SODIUM BLD-SCNC: 136 MMOL/L (ref 136–145)
TOTAL PROTEIN: 5.1 G/DL (ref 6.4–8.2)
WBC # BLD: 6.5 K/UL (ref 4–11)

## 2023-01-29 PROCEDURE — 85025 COMPLETE CBC W/AUTO DIFF WBC: CPT

## 2023-01-29 PROCEDURE — 6360000002 HC RX W HCPCS: Performed by: INTERNAL MEDICINE

## 2023-01-29 PROCEDURE — 83036 HEMOGLOBIN GLYCOSYLATED A1C: CPT

## 2023-01-29 PROCEDURE — 6370000000 HC RX 637 (ALT 250 FOR IP): Performed by: INTERNAL MEDICINE

## 2023-01-29 PROCEDURE — 1200000000 HC SEMI PRIVATE

## 2023-01-29 PROCEDURE — 2700000000 HC OXYGEN THERAPY PER DAY

## 2023-01-29 PROCEDURE — 94761 N-INVAS EAR/PLS OXIMETRY MLT: CPT

## 2023-01-29 PROCEDURE — 36415 COLL VENOUS BLD VENIPUNCTURE: CPT

## 2023-01-29 PROCEDURE — 84145 PROCALCITONIN (PCT): CPT

## 2023-01-29 PROCEDURE — 80053 COMPREHEN METABOLIC PANEL: CPT

## 2023-01-29 PROCEDURE — 2580000003 HC RX 258: Performed by: INTERNAL MEDICINE

## 2023-01-29 RX ORDER — PREDNISONE 20 MG/1
40 TABLET ORAL DAILY
Status: DISCONTINUED | OUTPATIENT
Start: 2023-01-29 | End: 2023-01-30 | Stop reason: HOSPADM

## 2023-01-29 RX ADMIN — SODIUM CHLORIDE: 9 INJECTION, SOLUTION INTRAVENOUS at 11:42

## 2023-01-29 RX ADMIN — SODIUM CHLORIDE, PRESERVATIVE FREE 10 ML: 5 INJECTION INTRAVENOUS at 20:14

## 2023-01-29 RX ADMIN — SODIUM CHLORIDE: 9 INJECTION, SOLUTION INTRAVENOUS at 20:43

## 2023-01-29 RX ADMIN — SODIUM CHLORIDE, PRESERVATIVE FREE 10 ML: 5 INJECTION INTRAVENOUS at 00:06

## 2023-01-29 RX ADMIN — LEVOFLOXACIN 750 MG: 5 INJECTION, SOLUTION INTRAVENOUS at 04:56

## 2023-01-29 RX ADMIN — SODIUM CHLORIDE: 9 INJECTION, SOLUTION INTRAVENOUS at 00:08

## 2023-01-29 RX ADMIN — ENOXAPARIN SODIUM 40 MG: 100 INJECTION SUBCUTANEOUS at 09:15

## 2023-01-29 RX ADMIN — PREDNISONE 40 MG: 20 TABLET ORAL at 13:30

## 2023-01-29 RX ADMIN — INSULIN LISPRO 4 UNITS: 100 INJECTION, SOLUTION INTRAVENOUS; SUBCUTANEOUS at 09:12

## 2023-01-29 NOTE — PROGRESS NOTES
Pt resting in bed most of the day, ambulates independently as needed. Pt expressed desire to be d/c today, but agreed to wait an additional day to receive another dose of antibiotics. Pt denies nausea or pain at this time, eating most of his breakfast and lunch. Afternoon blood sugar 58, gave 4 oz orange juice with afternoon meal. Standard safety precautions in place, gripper socks in use. Pt denies any other needs at this time.

## 2023-01-29 NOTE — PROGRESS NOTES
PerfectServe sent to Dr. Florian Cannon:    Pt blood sugar 58 at 1135 and 95 at 1702. Still ok to give 80 units of Lantus now? Thank you! Awaiting response.

## 2023-01-30 VITALS
DIASTOLIC BLOOD PRESSURE: 82 MMHG | HEART RATE: 77 BPM | OXYGEN SATURATION: 95 % | HEIGHT: 70 IN | TEMPERATURE: 97.5 F | SYSTOLIC BLOOD PRESSURE: 134 MMHG | WEIGHT: 173.1 LBS | BODY MASS INDEX: 24.78 KG/M2 | RESPIRATION RATE: 14 BRPM

## 2023-01-30 LAB
ANION GAP SERPL CALCULATED.3IONS-SCNC: 7 MMOL/L (ref 3–16)
BASOPHILS ABSOLUTE: 0 K/UL (ref 0–0.2)
BASOPHILS RELATIVE PERCENT: 0.2 %
BUN BLDV-MCNC: 12 MG/DL (ref 7–20)
CALCIUM SERPL-MCNC: 8.8 MG/DL (ref 8.3–10.6)
CHLORIDE BLD-SCNC: 101 MMOL/L (ref 99–110)
CO2: 27 MMOL/L (ref 21–32)
CREAT SERPL-MCNC: 0.7 MG/DL (ref 0.9–1.3)
EOSINOPHILS ABSOLUTE: 0 K/UL (ref 0–0.6)
EOSINOPHILS RELATIVE PERCENT: 0.1 %
GFR SERPL CREATININE-BSD FRML MDRD: >60 ML/MIN/{1.73_M2}
GLUCOSE BLD-MCNC: 217 MG/DL (ref 70–99)
GLUCOSE BLD-MCNC: 230 MG/DL (ref 70–99)
GLUCOSE BLD-MCNC: 233 MG/DL (ref 70–99)
GLUCOSE BLD-MCNC: 279 MG/DL (ref 70–99)
GLUCOSE BLD-MCNC: 322 MG/DL (ref 70–99)
HCT VFR BLD CALC: 38.4 % (ref 40.5–52.5)
HEMOGLOBIN: 13.1 G/DL (ref 13.5–17.5)
LYMPHOCYTES ABSOLUTE: 1.4 K/UL (ref 1–5.1)
LYMPHOCYTES RELATIVE PERCENT: 14.8 %
MCH RBC QN AUTO: 31.1 PG (ref 26–34)
MCHC RBC AUTO-ENTMCNC: 34.1 G/DL (ref 31–36)
MCV RBC AUTO: 91 FL (ref 80–100)
MONOCYTES ABSOLUTE: 0.6 K/UL (ref 0–1.3)
MONOCYTES RELATIVE PERCENT: 5.9 %
NEUTROPHILS ABSOLUTE: 7.4 K/UL (ref 1.7–7.7)
NEUTROPHILS RELATIVE PERCENT: 79 %
PDW BLD-RTO: 13.9 % (ref 12.4–15.4)
PERFORMED ON: ABNORMAL
PLATELET # BLD: 222 K/UL (ref 135–450)
PMV BLD AUTO: 6.8 FL (ref 5–10.5)
POTASSIUM SERPL-SCNC: 4.5 MMOL/L (ref 3.5–5.1)
RBC # BLD: 4.22 M/UL (ref 4.2–5.9)
SODIUM BLD-SCNC: 135 MMOL/L (ref 136–145)
WBC # BLD: 9.4 K/UL (ref 4–11)

## 2023-01-30 PROCEDURE — 80048 BASIC METABOLIC PNL TOTAL CA: CPT

## 2023-01-30 PROCEDURE — 2580000003 HC RX 258: Performed by: INTERNAL MEDICINE

## 2023-01-30 PROCEDURE — 36415 COLL VENOUS BLD VENIPUNCTURE: CPT

## 2023-01-30 PROCEDURE — 85025 COMPLETE CBC W/AUTO DIFF WBC: CPT

## 2023-01-30 PROCEDURE — 6370000000 HC RX 637 (ALT 250 FOR IP): Performed by: INTERNAL MEDICINE

## 2023-01-30 PROCEDURE — 6360000002 HC RX W HCPCS: Performed by: INTERNAL MEDICINE

## 2023-01-30 RX ORDER — LEVOFLOXACIN 750 MG/1
750 TABLET ORAL DAILY
Qty: 2 TABLET | Refills: 0 | Status: SHIPPED | OUTPATIENT
Start: 2023-01-30 | End: 2023-02-01

## 2023-01-30 RX ORDER — PREDNISONE 20 MG/1
40 TABLET ORAL DAILY
Qty: 6 TABLET | Refills: 0 | Status: SHIPPED | OUTPATIENT
Start: 2023-01-31 | End: 2023-02-03

## 2023-01-30 RX ADMIN — LEVOFLOXACIN 750 MG: 5 INJECTION, SOLUTION INTRAVENOUS at 04:53

## 2023-01-30 RX ADMIN — INSULIN LISPRO 8 UNITS: 100 INJECTION, SOLUTION INTRAVENOUS; SUBCUTANEOUS at 13:18

## 2023-01-30 RX ADMIN — INSULIN LISPRO 4 UNITS: 100 INJECTION, SOLUTION INTRAVENOUS; SUBCUTANEOUS at 09:39

## 2023-01-30 RX ADMIN — SODIUM CHLORIDE: 9 INJECTION, SOLUTION INTRAVENOUS at 04:54

## 2023-01-30 RX ADMIN — ENOXAPARIN SODIUM 40 MG: 100 INJECTION SUBCUTANEOUS at 09:38

## 2023-01-30 RX ADMIN — SODIUM CHLORIDE, PRESERVATIVE FREE 10 ML: 5 INJECTION INTRAVENOUS at 09:38

## 2023-01-30 RX ADMIN — PREDNISONE 40 MG: 20 TABLET ORAL at 09:38

## 2023-01-30 NOTE — PROGRESS NOTES
Physician Progress Note      Nikky Cedillo  [facility] #:                  [id number]  :                       1970  ADMIT DATE:       2023 6:13 PM  [address] [place] DATE:  RESPONDING  PROVIDER #:        Christiano Yanez MD          QUERY TEXT:    Patient admitted with cough/SOB. Documentation reflects sepsis d/t early PNA   per H&P. If possible, please document in the progress notes and discharge   summary if sepsis d/t PNA was: The medical record reflects the following:  Risk Factors: COPD, DM, acute bronchitis  Clinical Indicators: per H&P- \"Sepsis due to early pneumonia\". On arrival to   ED- -140, WBC 13.4, PCT 0.21  CT- No evidence of pulmonary embolism or acute pulmonary abnormality. Per attending PN -\"Acute bronchitis No pulmonary infiltrates on imaging. Physical exam not back to normal.... COPD exacerbation Despite absence of   infiltrates and normal procalcitonin, patient's lungs auscultation is   abnormal\"  Treatment: Labs, CT, 1 L IVF NS followed by 100 ml/hr cont. infusion, IV   Levaquin since admission, steroids, supportive monitored care    Thank you,  Leeanne Randle RN, BSN  [email or web address]. [employer]  Options provided:  -- Sepsis POA d/t PNA, treated and resolved, confirmed after study  -- Sepsis ruled out after study, PNA only confirmed POA  -- Sepsis d/t PNA ruled out, acute bronchitis w/ COPD exacerbation confirmed  -- Other - I will add my own diagnosis  -- Disagree - Not applicable / Not valid  -- Disagree - Clinically unable to determine / Unknown  -- Refer to Clinical Documentation Reviewer    PROVIDER RESPONSE TEXT:    Sepsis ruled out after study, PNA only confirmed POA. Query created by: Penny Crabtree on 2023 10:15 AM      QUERY TEXT:    Patient admitted with cough/SOB. Noted documentation of acute respiratory   failure by the ED provider on arrival and Hypoxemia per attending on H&P.  In   order to support the diagnosis of acute respiratory failure, please include   additional clinical indicators in your documentation. Or please document if   the diagnosis of acute respiratory failure has been ruled out after further   study. The medical record reflects the following:  Risk Factors: COPD, ? PNA/bronchitis  Clinical Indicators: no described work of breathing, O2 sat to 88% when   ambulated, VBG pH 7.307, RR max 32 per flow sheet, and 2L NC used until weaned   to RA 1/29  Treatment: O2, labs, CT, abx, steroids, supportive monitored care    Acute Respiratory Failure Clinical Indicators per  MS-DRG Training Guide and   Quick Reference Guide:  pO2 < 60 mmHg or SpO2 (pulse oximetry) < 91% breathing room air  pCO2 > 50 and pH < 7.35  P/F ratio (pO2 / FIO2) < 300  pO2 decrease or pCO2 increase by 10 mmHg from baseline (if known)  Supplemental oxygen of 40% or more  Presence of respiratory distress, tachypnea, dyspnea, shortness of breath,   wheezing  Unable to speak in complete sentences  Use of accessory muscles to breathe  Extreme anxiety and feeling of impending doom  Tripod position  Confusion/altered mental status/obtunded    Thank you,  Tami Crews RN, BSN  [email or web address]  Options provided:  -- Acute Respiratory Failure as evidenced by, Please document evidence. -- Acute Respiratory Failure ruled out after study, hypoxemia only confirmed  -- Other - I will add my own diagnosis  -- Disagree - Not applicable / Not valid  -- Disagree - Clinically unable to determine / Unknown  -- Refer to Clinical Documentation Reviewer    PROVIDER RESPONSE TEXT:    Acute Respiratory Failure has been ruled out after study, hypoxemia only   confirmed.     Query created by: Rob Garay on 1/30/2023 10:43 AM      Electronically signed by:  James Davis MD 1/30/2023 12:41 PM

## 2023-01-30 NOTE — DISCHARGE SUMMARY
Hospital Medicine Discharge Summary    Patient ID: Dwight Haines      Patient's PCP: Referring Not In System (Inactive)    Admit Date: 1/27/2023     Discharge Date:   01/30/23     Admitting Provider: Nia Rodriguez MD     Discharge Provider: Ewelina Bain MD     Discharge Diagnoses: Active Hospital Problems    Diagnosis     SOB (shortness of breath) [R06.02]      Priority: Medium    PNA (pneumonia) [J18.9]      Priority: Medium    Nausea & vomiting [R11.2]      Priority: Medium    Tobacco abuse [Z72.0]      Priority: Medium       The patient was seen and examined on day of discharge and this discharge summary is in conjunction with any daily progress note from day of discharge. Hospital Course:     Presented with shortness of breath and cough imaging did not show any infiltrates in the lung. However, clinically patient has lower respiratory infection. Started on levofloxacin and responded well. Also added po prednisone x 5 days. Physical exam had improved by the day of discharge. Patient was back on room air  Will be discharged home in stable condition  Recommend follow up with PCP later this week to be cleared for work, as currently physical exam is not back to normal.   Can drive. Not hypoxic, not hypoglycemic. Fully alert and oriented. Physical Exam Performed:     BP (!) 150/88 Comment: nurse notified  Pulse 75   Temp 97.7 °F (36.5 °C) (Oral)   Resp 16   Ht 5' 10\" (1.778 m)   Wt 173 lb 1.6 oz (78.5 kg)   SpO2 94%   BMI 24.84 kg/m²       General appearance:  No apparent distress, appears stated age and cooperative. HEENT:  Normal cephalic, atraumatic without obvious deformity. Pupils equal, round, and reactive to light. Extra ocular muscles intact. Conjunctivae/corneas clear. Neck: Supple, with full range of motion. No jugular venous distention. Trachea midline. Respiratory:  Normal respiratory effort. Posterior rhonchi. No crackles.  No wheezes  Cardiovascular:  Regular rate and rhythm with normal S1/S2 without murmurs, rubs or gallops. Abdomen: Soft, non-tender, non-distended with normal bowel sounds. Musculoskeletal:  No clubbing, cyanosis or edema bilaterally. Full range of motion without deformity. Skin: Skin color, texture, turgor normal.  No rashes or lesions. Neurologic:  Neurovascularly intact without any focal sensory/motor deficits. Cranial nerves: II-XII intact, grossly non-focal.  Psychiatric:  Alert and oriented, thought content appropriate, normal insight  Capillary Refill: Brisk,< 3 seconds   Peripheral Pulses: +2 palpable, equal bilaterally       Labs: For convenience and continuity at follow-up the following most recent labs are provided:      CBC:    Lab Results   Component Value Date/Time    WBC 9.4 01/30/2023 05:41 AM    HGB 13.1 01/30/2023 05:41 AM    HCT 38.4 01/30/2023 05:41 AM     01/30/2023 05:41 AM       Renal:    Lab Results   Component Value Date/Time     01/30/2023 05:41 AM    K 4.5 01/30/2023 05:41 AM    K 4.0 01/27/2023 07:00 PM     01/30/2023 05:41 AM    CO2 27 01/30/2023 05:41 AM    BUN 12 01/30/2023 05:41 AM    CREATININE 0.7 01/30/2023 05:41 AM    CALCIUM 8.8 01/30/2023 05:41 AM    PHOS 2.5 01/13/2022 06:12 PM         Significant Diagnostic Studies    Radiology:   CT ABDOMEN PELVIS W IV CONTRAST Additional Contrast? None   Final Result   Mild constipation with no obstruction. Mild prostatic enlargement with diffuse mild bladder wall thickening which   may just be due to poor distention. Recommend clinical follow-up      Mild bibasilar atelectasis or early infiltrates posteriorly. Suggest   follow-up with serial chest x-rays      Borderline hepatosplenomegaly with fatty replacement throughout the liver         CT CHEST PULMONARY EMBOLISM W CONTRAST   Final Result   No evidence of pulmonary embolism or acute pulmonary abnormality.          XR CHEST PORTABLE   Final Result   Stable chronic changes with no acute abnormality seen. Consults:     IP CONSULT TO HOSPITALIST    Disposition:  Home     Condition at Discharge: Stable    Discharge Instructions/Follow-up:  PCP    Code Status:  Full Code     Activity: activity as tolerated    Diet: diabetic diet      Discharge Medications:     Current Discharge Medication List             Details   levoFLOXacin (LEVAQUIN) 750 MG tablet Take 1 tablet by mouth daily for 2 days  Qty: 2 tablet, Refills: 0      predniSONE (DELTASONE) 20 MG tablet Take 2 tablets by mouth daily for 3 doses  Qty: 6 tablet, Refills: 0                Details   metFORMIN (GLUCOPHAGE) 500 MG tablet Take 500 mg by mouth 2 times daily (with meals)      insulin glargine (LANTUS) 100 UNIT/ML injection vial Inject 80 Units into the skin nightly      insulin lispro (HUMALOG) 100 UNIT/ML injection vial Inject  into the skin 3 times daily (before meals). ssi             Time Spent on discharge: 46 minutes in the examination, evaluation, counseling and review of medications and discharge plan. Signed:    Oksana Shelley MD   1/30/2023      Thank you for the opportunity to be involved in this patient's care. If you have any questions or concerns, please feel free to contact me at [phone].

## 2023-01-30 NOTE — CARE COORDINATION
CASE MANAGEMENT INITIAL ASSESSMENT      Reviewed chart and completed assessment with patient:bedside  Family present: none  Explained Case Management role/services. Primary contact information:[facility] Decision Maker :   Primary Decision Maker: Darcy maier - Brother/Sister - [phone]    Secondary Decision Maker: Tami Ryan - Aunt/Uncle - [phone]          Can this person be reached and be able to respond quickly, such as within a few minutes or hours? Yes      Admit date/status:1/28/23  [facility]   Is this a Readmission?:  No    Readmission Screening completed?: No     Insurance:medben   Precert required for SNF: Yes       3 night stay required: No    Living arrangements, Adls, care needs, prior to admission:lives in apartment in building his cousin owns and lives in as well. Durable Medical Equipment at home:  Walker__Cane__RTS__ BSC__Shower Chair__  02__ HHN__ CPAP__  BiPap__  Hospital Bed__ W/C___ Other_____    Services in the home and/or outpatient, prior to admission:none    Current PCP:Memo Howard                               Medications Prescription coverage? yes  No Will pt require financial assistance with medications: will follow for       Transportation needs: none, drove here       PT/OT recs:none    [address] Notification (HEN):needed for SNF    Barriers to discharge:none    Plan/comments:spoke with patient. Reported IPTA and drives. Will be able to get to any follow up appts. Denied any DCP needs.  Chrissy Gray RN       ECOC on chart for MD signature

## 2023-01-30 NOTE — PLAN OF CARE
Problem: Safety - Adult  Goal: Free from fall injury  1/29/2023 1149 by Laurel Stewart RN  Outcome: Progressing  Flowsheets (Taken 1/29/2023 1149)  Free From Fall Injury: Instruct family/caregiver on patient safety  1/29/2023 0643 by Kane Tirado RN  Outcome: Progressing     Problem: ABCDS Injury Assessment  Goal: Absence of physical injury  1/29/2023 1149 by Laurel Stewart RN  Outcome: Progressing  Flowsheets (Taken 1/28/2023 1038)  Absence of Physical Injury: Implement safety measures based on patient assessment  1/29/2023 0643 by Kane Tirado RN  Outcome: Progressing
Problem: Safety - Adult  Goal: Free from fall injury  Outcome: Adequate for Discharge     Problem: ABCDS Injury Assessment  Goal: Absence of physical injury  Outcome: Adequate for Discharge     Problem: Chronic Conditions and Co-morbidities  Goal: Patient's chronic conditions and co-morbidity symptoms are monitored and maintained or improved  Outcome: Adequate for Discharge
Problem: Safety - Adult  Goal: Free from fall injury  Outcome: Progressing     Problem: ABCDS Injury Assessment  Goal: Absence of physical injury  Outcome: Progressing
Problem: Safety - Adult  Goal: Free from fall injury  Outcome: Progressing  Flowsheets (Taken 1/28/2023 1038)  Free From Fall Injury:   Instruct family/caregiver on patient safety   Based on caregiver fall risk screen, instruct family/caregiver to ask for assistance with transferring infant if caregiver noted to have fall risk factors     Problem: ABCDS Injury Assessment  Goal: Absence of physical injury  Outcome: Progressing  Flowsheets (Taken 1/28/2023 1038)  Absence of Physical Injury: Implement safety measures based on patient assessment
show

## 2023-01-31 NOTE — PROGRESS NOTES
D/c to home instructions given to pt. Expressed understanding; denied questions. All pt belongings given to pt. Pt to obtain prescriptions from [facility] on the first floor upon discharge. Pt transported in stable condition via wheelchair to personal vehicle by volunteer services.        Electronically signed by Danika Gillespie RN on 1/30/2023 at 8:47 PM

## 2023-02-01 LAB — BLOOD CULTURE, ROUTINE: NORMAL

## 2023-02-21 ENCOUNTER — HOSPITAL ENCOUNTER (OUTPATIENT)
Dept: VASCULAR LAB | Age: 53
Discharge: HOME | End: 2023-02-21
Payer: COMMERCIAL

## 2023-02-21 DIAGNOSIS — R25.2 CALF CRAMP: ICD-10-CM

## 2023-02-21 PROCEDURE — 93922 UPR/L XTREMITY ART 2 LEVELS: CPT

## 2023-08-21 ENCOUNTER — APPOINTMENT (OUTPATIENT)
Dept: GENERAL RADIOLOGY | Age: 53
DRG: 866 | End: 2023-08-21
Payer: COMMERCIAL

## 2023-08-21 ENCOUNTER — APPOINTMENT (OUTPATIENT)
Dept: CT IMAGING | Age: 53
DRG: 866 | End: 2023-08-21
Payer: COMMERCIAL

## 2023-08-21 ENCOUNTER — HOSPITAL ENCOUNTER (INPATIENT)
Age: 53
LOS: 2 days | Discharge: HOME | DRG: 866 | End: 2023-08-25
Attending: INTERNAL MEDICINE | Admitting: INTERNAL MEDICINE
Payer: COMMERCIAL

## 2023-08-21 DIAGNOSIS — J18.9 PNEUMONIA DUE TO INFECTIOUS ORGANISM, UNSPECIFIED LATERALITY, UNSPECIFIED PART OF LUNG: Primary | ICD-10-CM

## 2023-08-21 DIAGNOSIS — E86.0 DEHYDRATION: ICD-10-CM

## 2023-08-21 DIAGNOSIS — A41.9 SEPSIS, DUE TO UNSPECIFIED ORGANISM, UNSPECIFIED WHETHER ACUTE ORGAN DYSFUNCTION PRESENT (HCC): ICD-10-CM

## 2023-08-21 PROBLEM — R50.9 ACUTE FEBRILE ILLNESS: Status: ACTIVE | Noted: 2023-08-21

## 2023-08-21 PROBLEM — E11.9 DM2 (DIABETES MELLITUS, TYPE 2) (HCC): Status: ACTIVE | Noted: 2023-08-21

## 2023-08-21 LAB
ALBUMIN SERPL-MCNC: 3.9 G/DL (ref 3.4–5)
ALBUMIN/GLOB SERPL: 1.3 {RATIO} (ref 1.1–2.2)
ALP SERPL-CCNC: 83 U/L (ref 40–129)
ALT SERPL-CCNC: 21 U/L (ref 10–40)
ANION GAP SERPL CALCULATED.3IONS-SCNC: 10 MMOL/L (ref 3–16)
ANISOCYTOSIS BLD QL SMEAR: ABNORMAL
AST SERPL-CCNC: 59 U/L (ref 15–37)
BASOPHILS # BLD: 0 K/UL (ref 0–0.2)
BASOPHILS NFR BLD: 0 %
BILIRUB SERPL-MCNC: 0.5 MG/DL (ref 0–1)
BUN SERPL-MCNC: 19 MG/DL (ref 7–20)
CALCIUM SERPL-MCNC: 8.9 MG/DL (ref 8.3–10.6)
CHLORIDE SERPL-SCNC: 97 MMOL/L (ref 99–110)
CO2 SERPL-SCNC: 23 MMOL/L (ref 21–32)
CREAT SERPL-MCNC: 0.9 MG/DL (ref 0.9–1.3)
DEPRECATED RDW RBC AUTO: 15.1 % (ref 12.4–15.4)
EKG ATRIAL RATE: 112 BPM
EKG DIAGNOSIS: NORMAL
EKG P AXIS: 67 DEGREES
EKG P-R INTERVAL: 138 MS
EKG Q-T INTERVAL: 318 MS
EKG QRS DURATION: 82 MS
EKG QTC CALCULATION (BAZETT): 434 MS
EKG R AXIS: -11 DEGREES
EKG T AXIS: 54 DEGREES
EKG VENTRICULAR RATE: 112 BPM
EOSINOPHIL # BLD: 0 K/UL (ref 0–0.6)
EOSINOPHIL NFR BLD: 0 %
FLUAV RNA RESP QL NAA+PROBE: NOT DETECTED
FLUBV RNA RESP QL NAA+PROBE: NOT DETECTED
GFR SERPLBLD CREATININE-BSD FMLA CKD-EPI: >60 ML/MIN/{1.73_M2}
GLUCOSE BLD-MCNC: 122 MG/DL (ref 70–99)
GLUCOSE BLD-MCNC: 65 MG/DL (ref 70–99)
GLUCOSE SERPL-MCNC: 125 MG/DL (ref 70–99)
HCT VFR BLD AUTO: 48.5 % (ref 40.5–52.5)
HETEROPH AB BLD QL IA: NEGATIVE
HGB BLD-MCNC: 16.5 G/DL (ref 13.5–17.5)
LACTATE BLDV-SCNC: 1.1 MMOL/L (ref 0.4–1.9)
LYMPHOCYTES # BLD: 0.7 K/UL (ref 1–5.1)
LYMPHOCYTES NFR BLD: 7 %
MACROCYTES BLD QL SMEAR: ABNORMAL
MCH RBC QN AUTO: 31.8 PG (ref 26–34)
MCHC RBC AUTO-ENTMCNC: 34.1 G/DL (ref 31–36)
MCV RBC AUTO: 93.2 FL (ref 80–100)
MONOCYTES # BLD: 0.1 K/UL (ref 0–1.3)
MONOCYTES NFR BLD: 1 %
NEUTROPHILS # BLD: 5.4 K/UL (ref 1.7–7.7)
NEUTROPHILS NFR BLD: 69 %
NEUTS BAND NFR BLD MANUAL: 19 % (ref 0–7)
NT-PROBNP SERPL-MCNC: 60 PG/ML (ref 0–124)
PERFORMED ON: ABNORMAL
PERFORMED ON: ABNORMAL
PLATELET # BLD AUTO: 171 K/UL (ref 135–450)
PLATELET BLD QL SMEAR: ADEQUATE
PMV BLD AUTO: 7.8 FL (ref 5–10.5)
POIKILOCYTOSIS BLD QL SMEAR: ABNORMAL
POLYCHROMASIA BLD QL SMEAR: ABNORMAL
POTASSIUM SERPL-SCNC: 4 MMOL/L (ref 3.5–5.1)
POTASSIUM SERPL-SCNC: 5.5 MMOL/L (ref 3.5–5.1)
PROCALCITONIN SERPL IA-MCNC: 0.19 NG/ML (ref 0–0.15)
PROT SERPL-MCNC: 7 G/DL (ref 6.4–8.2)
RBC # BLD AUTO: 5.2 M/UL (ref 4.2–5.9)
SARS-COV-2 RNA RESP QL NAA+PROBE: NOT DETECTED
SLIDE REVIEW: ABNORMAL
SODIUM SERPL-SCNC: 130 MMOL/L (ref 136–145)
TROPONIN, HIGH SENSITIVITY: 11 NG/L (ref 0–22)
TROPONIN, HIGH SENSITIVITY: 11 NG/L (ref 0–22)
VARIANT LYMPHS NFR BLD MANUAL: 4 % (ref 0–6)
WBC # BLD AUTO: 6.1 K/UL (ref 4–11)

## 2023-08-21 PROCEDURE — 84484 ASSAY OF TROPONIN QUANT: CPT

## 2023-08-21 PROCEDURE — 86308 HETEROPHILE ANTIBODY SCREEN: CPT

## 2023-08-21 PROCEDURE — 96361 HYDRATE IV INFUSION ADD-ON: CPT

## 2023-08-21 PROCEDURE — 80053 COMPREHEN METABOLIC PANEL: CPT

## 2023-08-21 PROCEDURE — 96365 THER/PROPH/DIAG IV INF INIT: CPT

## 2023-08-21 PROCEDURE — 87040 BLOOD CULTURE FOR BACTERIA: CPT

## 2023-08-21 PROCEDURE — 74177 CT ABD & PELVIS W/CONTRAST: CPT

## 2023-08-21 PROCEDURE — 36415 COLL VENOUS BLD VENIPUNCTURE: CPT

## 2023-08-21 PROCEDURE — 83880 ASSAY OF NATRIURETIC PEPTIDE: CPT

## 2023-08-21 PROCEDURE — 6360000004 HC RX CONTRAST MEDICATION: Performed by: PHYSICIAN ASSISTANT

## 2023-08-21 PROCEDURE — 93010 ELECTROCARDIOGRAM REPORT: CPT | Performed by: INTERNAL MEDICINE

## 2023-08-21 PROCEDURE — 96367 TX/PROPH/DG ADDL SEQ IV INF: CPT

## 2023-08-21 PROCEDURE — 84132 ASSAY OF SERUM POTASSIUM: CPT

## 2023-08-21 PROCEDURE — 2580000003 HC RX 258: Performed by: PHYSICIAN ASSISTANT

## 2023-08-21 PROCEDURE — 99285 EMERGENCY DEPT VISIT HI MDM: CPT

## 2023-08-21 PROCEDURE — 84145 PROCALCITONIN (PCT): CPT

## 2023-08-21 PROCEDURE — 71260 CT THORAX DX C+: CPT

## 2023-08-21 PROCEDURE — 2580000003 HC RX 258: Performed by: INTERNAL MEDICINE

## 2023-08-21 PROCEDURE — 6360000002 HC RX W HCPCS: Performed by: PHYSICIAN ASSISTANT

## 2023-08-21 PROCEDURE — 87636 SARSCOV2 & INF A&B AMP PRB: CPT

## 2023-08-21 PROCEDURE — 83605 ASSAY OF LACTIC ACID: CPT

## 2023-08-21 PROCEDURE — 85025 COMPLETE CBC W/AUTO DIFF WBC: CPT

## 2023-08-21 PROCEDURE — 93005 ELECTROCARDIOGRAM TRACING: CPT | Performed by: INTERNAL MEDICINE

## 2023-08-21 PROCEDURE — G0378 HOSPITAL OBSERVATION PER HR: HCPCS

## 2023-08-21 PROCEDURE — 71045 X-RAY EXAM CHEST 1 VIEW: CPT

## 2023-08-21 PROCEDURE — 6370000000 HC RX 637 (ALT 250 FOR IP): Performed by: INTERNAL MEDICINE

## 2023-08-21 RX ORDER — MAGNESIUM SULFATE 1 G/100ML
1000 INJECTION INTRAVENOUS PRN
Status: DISCONTINUED | OUTPATIENT
Start: 2023-08-21 | End: 2023-08-22

## 2023-08-21 RX ORDER — LANOLIN ALCOHOL/MO/W.PET/CERES
3 CREAM (GRAM) TOPICAL NIGHTLY PRN
Status: DISCONTINUED | OUTPATIENT
Start: 2023-08-22 | End: 2023-08-25 | Stop reason: HOSPADM

## 2023-08-21 RX ORDER — ACETAMINOPHEN 325 MG/1
650 TABLET ORAL EVERY 6 HOURS PRN
Status: DISCONTINUED | OUTPATIENT
Start: 2023-08-21 | End: 2023-08-25 | Stop reason: HOSPADM

## 2023-08-21 RX ORDER — SODIUM CHLORIDE 9 MG/ML
INJECTION, SOLUTION INTRAVENOUS PRN
Status: DISCONTINUED | OUTPATIENT
Start: 2023-08-21 | End: 2023-08-25 | Stop reason: HOSPADM

## 2023-08-21 RX ORDER — ONDANSETRON 2 MG/ML
4 INJECTION INTRAMUSCULAR; INTRAVENOUS EVERY 6 HOURS PRN
Status: DISCONTINUED | OUTPATIENT
Start: 2023-08-21 | End: 2023-08-25 | Stop reason: HOSPADM

## 2023-08-21 RX ORDER — POTASSIUM CHLORIDE 7.45 MG/ML
10 INJECTION INTRAVENOUS PRN
Status: DISCONTINUED | OUTPATIENT
Start: 2023-08-21 | End: 2023-08-22

## 2023-08-21 RX ORDER — INSULIN GLARGINE 100 [IU]/ML
80 INJECTION, SOLUTION SUBCUTANEOUS NIGHTLY
Status: DISCONTINUED | OUTPATIENT
Start: 2023-08-22 | End: 2023-08-24

## 2023-08-21 RX ORDER — INSULIN LISPRO 100 [IU]/ML
0.08 INJECTION, SOLUTION INTRAVENOUS; SUBCUTANEOUS
Status: DISCONTINUED | OUTPATIENT
Start: 2023-08-22 | End: 2023-08-25 | Stop reason: HOSPADM

## 2023-08-21 RX ORDER — 0.9 % SODIUM CHLORIDE 0.9 %
1000 INTRAVENOUS SOLUTION INTRAVENOUS ONCE
Status: COMPLETED | OUTPATIENT
Start: 2023-08-21 | End: 2023-08-21

## 2023-08-21 RX ORDER — DEXTROSE MONOHYDRATE 100 MG/ML
INJECTION, SOLUTION INTRAVENOUS CONTINUOUS PRN
Status: DISCONTINUED | OUTPATIENT
Start: 2023-08-21 | End: 2023-08-25 | Stop reason: HOSPADM

## 2023-08-21 RX ORDER — NICOTINE 21 MG/24HR
1 PATCH, TRANSDERMAL 24 HOURS TRANSDERMAL DAILY
Status: DISCONTINUED | OUTPATIENT
Start: 2023-08-21 | End: 2023-08-25 | Stop reason: HOSPADM

## 2023-08-21 RX ORDER — POTASSIUM CHLORIDE 20 MEQ/1
40 TABLET, EXTENDED RELEASE ORAL PRN
Status: DISCONTINUED | OUTPATIENT
Start: 2023-08-21 | End: 2023-08-22

## 2023-08-21 RX ORDER — ACETAMINOPHEN 650 MG/1
650 SUPPOSITORY RECTAL EVERY 6 HOURS PRN
Status: DISCONTINUED | OUTPATIENT
Start: 2023-08-21 | End: 2023-08-25 | Stop reason: HOSPADM

## 2023-08-21 RX ORDER — ENOXAPARIN SODIUM 100 MG/ML
40 INJECTION SUBCUTANEOUS DAILY
Status: DISCONTINUED | OUTPATIENT
Start: 2023-08-22 | End: 2023-08-23

## 2023-08-21 RX ORDER — INSULIN LISPRO 100 [IU]/ML
0-4 INJECTION, SOLUTION INTRAVENOUS; SUBCUTANEOUS NIGHTLY
Status: DISCONTINUED | OUTPATIENT
Start: 2023-08-21 | End: 2023-08-25 | Stop reason: HOSPADM

## 2023-08-21 RX ORDER — CALCIUM CARBONATE 500 MG/1
1000 TABLET, CHEWABLE ORAL 3 TIMES DAILY PRN
Status: DISCONTINUED | OUTPATIENT
Start: 2023-08-21 | End: 2023-08-25 | Stop reason: HOSPADM

## 2023-08-21 RX ORDER — SODIUM CHLORIDE 0.9 % (FLUSH) 0.9 %
10 SYRINGE (ML) INJECTION PRN
Status: DISCONTINUED | OUTPATIENT
Start: 2023-08-21 | End: 2023-08-25 | Stop reason: HOSPADM

## 2023-08-21 RX ORDER — INSULIN LISPRO 100 [IU]/ML
0-4 INJECTION, SOLUTION INTRAVENOUS; SUBCUTANEOUS
Status: DISCONTINUED | OUTPATIENT
Start: 2023-08-22 | End: 2023-08-25 | Stop reason: HOSPADM

## 2023-08-21 RX ORDER — SODIUM CHLORIDE, SODIUM LACTATE, POTASSIUM CHLORIDE, CALCIUM CHLORIDE 600; 310; 30; 20 MG/100ML; MG/100ML; MG/100ML; MG/100ML
INJECTION, SOLUTION INTRAVENOUS CONTINUOUS
Status: ACTIVE | OUTPATIENT
Start: 2023-08-21 | End: 2023-08-22

## 2023-08-21 RX ORDER — ONDANSETRON 4 MG/1
4 TABLET, ORALLY DISINTEGRATING ORAL EVERY 8 HOURS PRN
Status: DISCONTINUED | OUTPATIENT
Start: 2023-08-21 | End: 2023-08-25 | Stop reason: HOSPADM

## 2023-08-21 RX ADMIN — SODIUM CHLORIDE 1000 ML: 9 INJECTION, SOLUTION INTRAVENOUS at 17:32

## 2023-08-21 RX ADMIN — SODIUM CHLORIDE, POTASSIUM CHLORIDE, SODIUM LACTATE AND CALCIUM CHLORIDE: 600; 310; 30; 20 INJECTION, SOLUTION INTRAVENOUS at 23:03

## 2023-08-21 RX ADMIN — SODIUM CHLORIDE 1000 ML: 9 INJECTION, SOLUTION INTRAVENOUS at 21:35

## 2023-08-21 RX ADMIN — PIPERACILLIN AND TAZOBACTAM 3375 MG: 3; .375 INJECTION, POWDER, LYOPHILIZED, FOR SOLUTION INTRAVENOUS at 19:58

## 2023-08-21 RX ADMIN — VANCOMYCIN HYDROCHLORIDE 1000 MG: 1 INJECTION, POWDER, LYOPHILIZED, FOR SOLUTION INTRAVENOUS at 20:41

## 2023-08-21 RX ADMIN — ACETAMINOPHEN 650 MG: 325 TABLET ORAL at 23:29

## 2023-08-21 RX ADMIN — IOPAMIDOL 75 ML: 755 INJECTION, SOLUTION INTRAVENOUS at 19:34

## 2023-08-21 ASSESSMENT — PAIN - FUNCTIONAL ASSESSMENT
PAIN_FUNCTIONAL_ASSESSMENT: NONE - DENIES PAIN
PAIN_FUNCTIONAL_ASSESSMENT: NONE - DENIES PAIN

## 2023-08-21 NOTE — ED PROVIDER NOTES
[facility]  Emergency Department Encounter    Patient Name: Nilesh Mendoza  MRN: [id number]  [address]: 1970  Date of Evaluation: 8/21/2023  Provider: Scott Mensah DO  Note Started: 4:50 PM EDT 8/21/23    CHIEF COMPLAINT  Fatigue and Shortness of Breath (Pt to er with fatigue and sob since last night. Denies n/v/d. Denies others in work home environment with similar sx. . )    SHARED SERVICE VISIT  Evaluated by LIDYA. My supervising physician was available for consultation. HISTORY OF PRESENT ILLNESS  Nilesh Mendoza is a 45 y.o. male who presents to the ED due to history of weakness, fatigue and body aches. Patient reports symptoms began last night. States that he has had cough. Nonproductive. No hemoptysis. Half pack per day cigarette smoker. Denies pain with deep breaths. No headaches, lightheadedness dizziness. Denies chest pain. No shortness of breath. He denies abdominal discomfort. No nausea, vomiting or diarrhea. Reports that he just feels like sleeping. Denies sick contacts. No urinary symptoms. .    No other complaints, modifying factors or associated symptoms. Nursing notes reviewed were all reviewed and agreed with or any disagreements were addressed in the HPI. PMH:  Past Medical History:   Diagnosis Date    Chronic arm pain     Diabetes mellitus ([address])      Surgical History:  Past Surgical History:   Procedure Laterality Date    APPENDECTOMY       Family History:  History reviewed. No pertinent family history.     Social History:  Social History     Socioeconomic History    Marital status:      Spouse name: Not on file    Number of children: Not on file    Years of education: Not on file    Highest education level: Not on file   Occupational History    Not on file   Tobacco Use    Smoking status: Every Day     Packs/day: 0.50     Types: Cigarettes    Smokeless tobacco: Not on file   Vaping Use    Vaping Use: Never used   Substance and Sexual Reviewed   CBC WITH AUTO DIFFERENTIAL - Abnormal; Notable for the following components:       Result Value    Lymphocytes Absolute 0.7 (*)     Bands Relative 19 (*)     Anisocytosis Occasional (*)     Macrocytes Occasional (*)     Polychromasia Occasional (*)     Poikilocytes Occasional (*)     All other components within normal limits   COMPREHENSIVE METABOLIC PANEL W/ REFLEX TO MG FOR LOW K - Abnormal; Notable for the following components:    Sodium 130 (*)     Potassium reflex Magnesium 5.5 (*)     Chloride 97 (*)     Glucose 125 (*)     AST 59 (*)     All other components within normal limits   PROCALCITONIN - Abnormal; Notable for the following components:    Procalcitonin 0.19 (*)     All other components within normal limits   CBC WITH AUTO DIFFERENTIAL - Abnormal; Notable for the following components:    WBC 3.6 (*)     Platelets 034 (*)     Lymphocytes Absolute 0.2 (*)     All other components within normal limits    Narrative:     Collection has been rescheduled by GRADE at 08/22/2023 05:25 Reason:                   Failed attempt at venipuncture Failed attempt at venipuncture   COMPREHENSIVE METABOLIC PANEL - Abnormal; Notable for the following components:    Creatinine 0.8 (*)     All other components within normal limits    Narrative:     Collection has been rescheduled by GRADE at 08/22/2023 05:25 Reason:                   Failed attempt at venipuncture Failed attempt at venipuncture   PROCALCITONIN - Abnormal; Notable for the following components:    Procalcitonin 0.17 (*)     All other components within normal limits    Narrative:     Collection has been rescheduled by GRADE at 08/22/2023 05:25 Reason:                   Failed attempt at venipuncture Failed attempt at venipuncture   POCT GLUCOSE - Abnormal; Notable for the following components:    POC Glucose 65 (*)     All other components within normal limits   POCT GLUCOSE - Abnormal; Notable for the following components:    POC Glucose 122 (*)

## 2023-08-21 NOTE — ED NOTES
Pt presents to ED with c/o aches and increasing weakness that started yesterday. Pt denies SOB and no swelling present in BLE. S1,S2 heart sounds noted, lung sounds clear bilaterally.       John Can RN  08/21/23 [id number]

## 2023-08-22 LAB
ALBUMIN SERPL-MCNC: 3.5 G/DL (ref 3.4–5)
ALBUMIN/GLOB SERPL: 1.2 {RATIO} (ref 1.1–2.2)
ALP SERPL-CCNC: 84 U/L (ref 40–129)
ALT SERPL-CCNC: 16 U/L (ref 10–40)
ANION GAP SERPL CALCULATED.3IONS-SCNC: 13 MMOL/L (ref 3–16)
AST SERPL-CCNC: 32 U/L (ref 15–37)
BASOPHILS # BLD: 0 K/UL (ref 0–0.2)
BASOPHILS NFR BLD: 0 %
BILIRUB SERPL-MCNC: 0.6 MG/DL (ref 0–1)
BUN SERPL-MCNC: 12 MG/DL (ref 7–20)
CALCIUM SERPL-MCNC: 8.6 MG/DL (ref 8.3–10.6)
CHLORIDE SERPL-SCNC: 102 MMOL/L (ref 99–110)
CO2 SERPL-SCNC: 21 MMOL/L (ref 21–32)
CREAT SERPL-MCNC: 0.8 MG/DL (ref 0.9–1.3)
DEPRECATED RDW RBC AUTO: 15.1 % (ref 12.4–15.4)
EOSINOPHIL # BLD: 0 K/UL (ref 0–0.6)
EOSINOPHIL NFR BLD: 0.2 %
GFR SERPLBLD CREATININE-BSD FMLA CKD-EPI: >60 ML/MIN/{1.73_M2}
GLUCOSE BLD-MCNC: 119 MG/DL (ref 70–99)
GLUCOSE BLD-MCNC: 195 MG/DL (ref 70–99)
GLUCOSE BLD-MCNC: 206 MG/DL (ref 70–99)
GLUCOSE BLD-MCNC: 241 MG/DL (ref 70–99)
GLUCOSE BLD-MCNC: 264 MG/DL (ref 70–99)
GLUCOSE BLD-MCNC: 86 MG/DL (ref 70–99)
GLUCOSE SERPL-MCNC: 95 MG/DL (ref 70–99)
HCT VFR BLD AUTO: 45.9 % (ref 40.5–52.5)
HGB BLD-MCNC: 15.7 G/DL (ref 13.5–17.5)
INR PPP: 1 (ref 0.84–1.16)
LYMPHOCYTES # BLD: 0.2 K/UL (ref 1–5.1)
LYMPHOCYTES NFR BLD: 6.7 %
MAGNESIUM SERPL-MCNC: 2 MG/DL (ref 1.8–2.4)
MCH RBC QN AUTO: 32 PG (ref 26–34)
MCHC RBC AUTO-ENTMCNC: 34.1 G/DL (ref 31–36)
MCV RBC AUTO: 93.8 FL (ref 80–100)
MONOCYTES # BLD: 0.1 K/UL (ref 0–1.3)
MONOCYTES NFR BLD: 1.9 %
NEUTROPHILS # BLD: 3.3 K/UL (ref 1.7–7.7)
NEUTROPHILS NFR BLD: 91.2 %
PERFORMED ON: ABNORMAL
PERFORMED ON: NORMAL
PLATELET # BLD AUTO: 114 K/UL (ref 135–450)
PMV BLD AUTO: 6.5 FL (ref 5–10.5)
POTASSIUM SERPL-SCNC: 4.1 MMOL/L (ref 3.5–5.1)
PROCALCITONIN SERPL IA-MCNC: 0.17 NG/ML (ref 0–0.15)
PROT SERPL-MCNC: 6.4 G/DL (ref 6.4–8.2)
PROTHROMBIN TIME: 13.2 SEC (ref 11.5–14.8)
RBC # BLD AUTO: 4.89 M/UL (ref 4.2–5.9)
SODIUM SERPL-SCNC: 136 MMOL/L (ref 136–145)
WBC # BLD AUTO: 3.6 K/UL (ref 4–11)

## 2023-08-22 PROCEDURE — 6370000000 HC RX 637 (ALT 250 FOR IP): Performed by: INTERNAL MEDICINE

## 2023-08-22 PROCEDURE — 84145 PROCALCITONIN (PCT): CPT

## 2023-08-22 PROCEDURE — 36415 COLL VENOUS BLD VENIPUNCTURE: CPT

## 2023-08-22 PROCEDURE — 85610 PROTHROMBIN TIME: CPT

## 2023-08-22 PROCEDURE — 6360000002 HC RX W HCPCS: Performed by: INTERNAL MEDICINE

## 2023-08-22 PROCEDURE — 96361 HYDRATE IV INFUSION ADD-ON: CPT

## 2023-08-22 PROCEDURE — 94761 N-INVAS EAR/PLS OXIMETRY MLT: CPT

## 2023-08-22 PROCEDURE — 2700000000 HC OXYGEN THERAPY PER DAY

## 2023-08-22 PROCEDURE — 80053 COMPREHEN METABOLIC PANEL: CPT

## 2023-08-22 PROCEDURE — 96372 THER/PROPH/DIAG INJ SC/IM: CPT

## 2023-08-22 PROCEDURE — 83735 ASSAY OF MAGNESIUM: CPT

## 2023-08-22 PROCEDURE — 85025 COMPLETE CBC W/AUTO DIFF WBC: CPT

## 2023-08-22 PROCEDURE — 2580000003 HC RX 258: Performed by: INTERNAL MEDICINE

## 2023-08-22 PROCEDURE — G0378 HOSPITAL OBSERVATION PER HR: HCPCS

## 2023-08-22 PROCEDURE — 83036 HEMOGLOBIN GLYCOSYLATED A1C: CPT

## 2023-08-22 RX ADMIN — INSULIN LISPRO 1 UNITS: 100 INJECTION, SOLUTION INTRAVENOUS; SUBCUTANEOUS at 13:01

## 2023-08-22 RX ADMIN — INSULIN GLARGINE 80 UNITS: 100 INJECTION, SOLUTION SUBCUTANEOUS at 20:22

## 2023-08-22 RX ADMIN — INSULIN LISPRO 6 UNITS: 100 INJECTION, SOLUTION INTRAVENOUS; SUBCUTANEOUS at 13:01

## 2023-08-22 RX ADMIN — INSULIN LISPRO 1 UNITS: 100 INJECTION, SOLUTION INTRAVENOUS; SUBCUTANEOUS at 17:45

## 2023-08-22 RX ADMIN — SODIUM CHLORIDE, POTASSIUM CHLORIDE, SODIUM LACTATE AND CALCIUM CHLORIDE: 600; 310; 30; 20 INJECTION, SOLUTION INTRAVENOUS at 06:30

## 2023-08-22 RX ADMIN — ENOXAPARIN SODIUM 40 MG: 100 INJECTION SUBCUTANEOUS at 10:26

## 2023-08-22 RX ADMIN — INSULIN LISPRO 6 UNITS: 100 INJECTION, SOLUTION INTRAVENOUS; SUBCUTANEOUS at 17:46

## 2023-08-22 RX ADMIN — INSULIN LISPRO 6 UNITS: 100 INJECTION, SOLUTION INTRAVENOUS; SUBCUTANEOUS at 10:26

## 2023-08-22 NOTE — PROGRESS NOTES
4 Eyes Skin Assessment     The patient is being assess for  Admission    I agree that 2 RN's have performed a thorough Head to Toe Skin Assessment on the patient. ALL assessment sites listed below have been assessed. Areas assessed by both nurses: FT  [x]   Head, Face, and Ears   [x]   Shoulders, Back, and Chest  [x]   Arms, Elbows, and Hands   [x]   Coccyx, Sacrum, and IschIum  [x]   Legs, Feet, and Heels        Does the Patient have Skin Breakdown?   No         Cirilo Prevention initiated:  No   Wound Care Orders initiated:  No      [facility] nurse consulted for Pressure Injury (Stage 3,4, Unstageable, DTI, NWPT, and Complex wounds), New and Established Ostomies:  No      Nurse 1 eSignature: Electronically signed by Agnieszka Mabry RN on 8/22/23 at 6:23 AM EDT    **SHARE this note so that the co-signing nurse is able to place an eSignature**    Nurse 2 eSignature: Electronically signed by Catherine Bonner RN on 8/22/23 at 7:01 AM EDT

## 2023-08-22 NOTE — PLAN OF CARE
Problem: Safety - Adult  Goal: Free from fall injury  Outcome: Progressing  Flowsheets (Taken 8/22/2023 6729)  Free From Fall Injury:   Instruct family/caregiver on patient safety   Based on caregiver fall risk screen, instruct family/caregiver to ask for assistance with transferring infant if caregiver noted to have fall risk factors

## 2023-08-22 NOTE — PROGRESS NOTES
Pt a/o. VSS. Shift assessment updated and documented. Patient with acute febrile illness  ,  had an episode fever and was given Tylenol  with desired outcomes. Denies any other symptoms of SOB or pain. IV fluid in progress and infusing in well.      Will continue to monitor progress

## 2023-08-22 NOTE — H&P
CREATININE 0.9 08/21/2023    BUN 19 08/21/2023     (L) 08/21/2023    K 4.0 08/21/2023    CL 97 (L) 08/21/2023    CO2 23 08/21/2023     Lab Results   Component Value Date    ALT 21 08/21/2023    AST 59 (H) 08/21/2023    ALKPHOS 83 08/21/2023    BILITOT 0.5 08/21/2023     No results for input(s): PHART, DZR4NPJ, PO2ART in the last 72 hours. IMAGING:  CT ABDOMEN PELVIS W IV CONTRAST Additional Contrast? None    Result Date: 8/21/2023  EXAMINATION: CT OF THE ABDOMEN AND PELVIS WITH CONTRAST 8/21/2023 7:20 pm TECHNIQUE: CT of the abdomen and pelvis was performed with the administration of intravenous contrast. Multiplanar reformatted images are provided for review. Automated exposure control, iterative reconstruction, and/or weight based adjustment of the mA/kV was utilized to reduce the radiation dose to as low as reasonably achievable. COMPARISON: 01/27/2023 HISTORY: ORDERING SYSTEM PROVIDED HISTORY: n/v TECHNOLOGIST PROVIDED HISTORY: Reason for exam:->n/v Additional Contrast?->None Decision Support Exception - unselect if not a suspected or confirmed emergency medical condition->Emergency Medical Condition (MA) Reason for Exam: Tired, no energy and SOB since last night, aching all over and stiff Relevant Medical/Surgical History: appy FINDINGS: Lower chest: Heart size is normal. Lungs are 2 multiple tiny nodules in the lower lobes which are new. This is most likely related to an atypical pneumonia. Liver: Liver is normal density. No enhancing masses. Normal enhancement of the intrahepatic vasculature. Spleen:  Normal size. No enhancing masses Pancreas: No enhancing masses. No ductal dilation. No adjacent fatty stranding. Gallbladder no calcified stones or sludge. No pericholecystic fluid. No wall thickening. Bile ducts: No biliary ductal dilation Adrenals: The adrenal glands are unremarkable Kidneys: Kidneys are normal in appearance. No hydronephrosis. No enhancing masses. Norenal stones.  GI: No small Reason for exam:->tachy/sob Decision Support Exception - unselect if not a suspected or confirmed emergency medical condition->Emergency Medical Condition (MA) Reason for Exam: Tired, no energy and SOB since last night Relevant Medical/Surgical History: 44 years FINDINGS: Pulmonary Arteries: Pulmonary arteries are adequately opacified for evaluation. No evidence of intraluminal filling defect to suggest pulmonary embolism. Main pulmonary artery is normal in caliber. Mediastinum: Paratracheal nodes measure up to 1.3 cm. AP window nodes measure up to 1.1 cm precarinal nodes measure up to 1.2 cm and subcarinal nodes to 1.1 cm. Right hilar nodes measure up to 1.3 cm. The heart and pericardium demonstrate no acute abnormality. There is no acute abnormality of the thoracic aorta. Lungs/pleura: Changes of bronchiectasis and mild interstitial lung disease worse in the lung apices. Tree-in-bud infiltrates in the left lower lobe may represent acute bronchiectasis. Incidental pulmonary nodule in the left lower lobe measures 6.4 mm. No evidence of pleural effusion or pneumothorax. Right upper lobe pulmonary nodule measures 7.4 mm. These findings were previously seen as well on January 27, 2023. Upper Abdomen: Limited images of the upper abdomen are unremarkable. Soft Tissues/Bones: No acute bone or soft tissue abnormality. No evidence of pulmonary embolism or acute aortic disease. Changes of bronchiectasis with areas of acute inflammation and tree-in-bud infiltrates likely representing acute on chronic bronchiectatic changes. Interstitial lung disease worse in the lung apices. No evidence of consolidative infiltrates. No active pleural disease. Redemonstration of pulmonary nodules as delineated above. I directly reviewed all recent imaging studies as well as pertinent prior studies. Radiology reports may or may not be available at the time of my review.   My comments and findings are as follows:  Pulmonary

## 2023-08-22 NOTE — PROGRESS NOTES
Admitted from ER , patient alert and oriented ,vitals checked , sinus tachy 116  , Temp 100.1 On room air  , o2 AT 93, No SOB , denies pain. Bolus saline infusion in progress .

## 2023-08-22 NOTE — PROGRESS NOTES
Shift assessment completed & charted. SpO2 88-89% on RA. Placed on 2L oxygen per NC, SpO2 now maintaining 92-93%. Pt tachycardic throughout the shift, MD aware. Pt denies chest pain or palpitations. Pt afebrile throughout the shift. Reports feeling unwell but denies pain, SOB, or dizziness. Denies any further needs. Bed locked & in lowest position. Call light & bedside table within reach.

## 2023-08-22 NOTE — PROGRESS NOTES
Hospital Medicine Progress Note      Date of Admission: 8/21/2023  Hospital Day: 2    Chief Admission Complaint:  SOB/Fatigue     Subjective:  no new c/o    Presenting Admission History:       Jesus Guillen is a 45 y.o. male. He presented to the ER from home. He relates he has been feeling \"drained\" since yesterday. He has no energy to do anything. He also described generalized myalgias and intermittent nausea. He ate an unusually small amount today. He has not vomited or had diarrhea. He felt chilled earlier today. He denies all respiratory symptoms. He lives with one of his cousins. He is outside a lot. He has not noticed any recent mosquito, tick, or other bites. Despite feeling ill the patient has continued to take his antidiabetic medications. He already took lantus 80 units tonight before coming to the ER\"    Assessment/Plan:      Current Principal Problem:  Acute febrile illness      Acute febrile illness , Viral Prodrome  -  Clinical syndrome is nonspecific at this point and is consistent with a viral prodrome. Patient was offered d/c to home w/ symptomatic treatment and return precautions or observation in the hospital for more diagnostics and symptom control. He elected to stay in the hospital.  -  Normal WBC count w/ diff notable for 19% bands, 4% atypical lymphocytes. Add monospot to labs. Consider repeat testing for COVID-19 if respiratory symptoms emerge.  -  No clinical syndrome c/w pneumonia. Monitor for emergence of a distinct clinical syndrome.  -  Start LR @ 125 mL/hr, monitor oral intake and urine output, as needed antiemetics, antipyretics, etc.     DM2  -  Hold all oral antidiabetic agents. Start s.c. Insulin regimen based on home regimen. Tobacco abuse  -  Not formally diagnosed with lung disease. No PFT's. Has been admitted here several times over the past few years for respiratory illnesses.   CT imaging is c/w findings of upper-lobe predominant monitoring for nephrotoxicity     [] Post-Cath serial renal monitoring for Contrast Induced Nephropathy  [] IV Narcotic analgesia for ADR   [] Aggressive IV diuresis requiring serial renal monitoring for electrolyte derangements  [] Hypertonic Saline requiring serial renal monitoring for appropriate electrolyte correction rate   [] Other -    [] Change in code status:    [] Decision to escalate care:    [] Major surgery/procedure with associated risk factors:    ----------------------------------------------------------------------  C. Data (any 2)  [] Discussed management of the case with:    [] Imaging personally reviewed and interpreted, includes:   [] Telemetry monitoring     [] Data Review (any 3)  [] Collateral history obtained from:    [] All available Consultant notes from yesterday/today were reviewed  [x] All current labs were reviewed and interpreted for clinical significance   [x] Appropriate follow-up labs were ordered    Medications:  Personally reviewed in detail in conjunction w/ labs as documented for evidence of drug toxicity. Infusion Medications    dextrose      sodium chloride      lactated ringers IV soln 125 mL/hr at 08/22/23 0630     Scheduled Medications    enoxaparin  40 mg SubCUTAneous Daily    insulin glargine  80 Units SubCUTAneous Nightly    insulin lispro  0.08 Units/kg SubCUTAneous TID WC    insulin lispro  0-4 Units SubCUTAneous TID WC    insulin lispro  0-4 Units SubCUTAneous Nightly    nicotine  1 patch TransDERmal Daily     PRN Meds: glucose, dextrose bolus **OR** dextrose bolus, glucagon (rDNA), dextrose, sodium chloride flush, sodium chloride, ondansetron **OR** ondansetron, acetaminophen **OR** acetaminophen, melatonin, calcium carbonate     Labs:  Personally reviewed and interpreted for clinical significance.      Recent Labs     08/21/23  1651 08/22/23  0553   WBC 6.1 3.6*   HGB 16.5 15.7   HCT 48.5 45.9    114*     Recent Labs     08/21/23  1651 08/21/23  1281

## 2023-08-22 NOTE — CARE COORDINATION
Case Management Assessment  Initial Evaluation    Date/Time of Evaluation: 8/22/2023 9:47 AM  Assessment Completed by: SOPHY Monique    If patient is discharged prior to next notation, then this note serves as note for discharge by case management. Patient Name: Noemi Marley                   YOB: 1970  Diagnosis: Acute febrile illness [R50.9]  Pneumonia due to infectious organism, unspecified laterality, unspecified part of lung [J18.9]  Sepsis, due to unspecified organism, unspecified whether acute organ dysfunction present [facility]) [A41.9]                   Date / Time: 8/21/2023  4:42 PM    Patient Admission Status: Observation   Readmission Risk (Low < 19, Mod (19-27), High > 27): Readmission Risk Score: 8.4    Current PCP: Iva Higginbotham, DO  PCP verified by CM? Yes (Care here in [facility] PCP is )    Chart Reviewed: Yes      History Provided by: Patient  Patient Orientation: Alert and Oriented, Person, Place, Situation, Self    Patient Cognition: Alert (flat affect)    Hospitalization in the last 30 days (Readmission):  No      Advance Directives:      Code Status: Full Code   Patient's Primary Decision Maker is: Legal Next of Kin    Primary Decision Maker: Serge Marge  Brother/Sister - [phone]    Discharge Planning:    Patient lives with: Family Members Type of Home: House  Primary Care Giver: Self  Patient Support Systems include: Family Members   Current Financial resources: Other (Comment)  Current services prior to admission: None            Type of Home Care services:  None    ADLS  Prior functional level: Independent in ADLs/IADLs  Current functional level: Independent in ADLs/IADLs    Family can provide assistance at DC: No  Would you like Case Management to discuss the discharge plan with any other family members/significant others, and if so, who?  No  Plans to Return to Present Housing: Yes  Potential Assistance needed at discharge: N/A  Patient expects to

## 2023-08-23 PROBLEM — J96.01 ACUTE RESPIRATORY FAILURE WITH HYPOXIA (HCC): Status: ACTIVE | Noted: 2023-08-23

## 2023-08-23 LAB
ALBUMIN SERPL-MCNC: 3.2 G/DL (ref 3.4–5)
ALBUMIN/GLOB SERPL: 1.3 {RATIO} (ref 1.1–2.2)
ALP SERPL-CCNC: 75 U/L (ref 40–129)
ALT SERPL-CCNC: 21 U/L (ref 10–40)
ANION GAP SERPL CALCULATED.3IONS-SCNC: 8 MMOL/L (ref 3–16)
AST SERPL-CCNC: 30 U/L (ref 15–37)
BASOPHILS # BLD: 0 K/UL (ref 0–0.2)
BASOPHILS NFR BLD: 0.4 %
BILIRUB SERPL-MCNC: 0.6 MG/DL (ref 0–1)
BUN SERPL-MCNC: 13 MG/DL (ref 7–20)
CALCIUM SERPL-MCNC: 8.4 MG/DL (ref 8.3–10.6)
CHLORIDE SERPL-SCNC: 98 MMOL/L (ref 99–110)
CO2 SERPL-SCNC: 25 MMOL/L (ref 21–32)
CREAT SERPL-MCNC: 0.8 MG/DL (ref 0.9–1.3)
DEPRECATED RDW RBC AUTO: 14.5 % (ref 12.4–15.4)
EOSINOPHIL # BLD: 0 K/UL (ref 0–0.6)
EOSINOPHIL NFR BLD: 0.1 %
GFR SERPLBLD CREATININE-BSD FMLA CKD-EPI: >60 ML/MIN/{1.73_M2}
GLUCOSE BLD-MCNC: 121 MG/DL (ref 70–99)
GLUCOSE BLD-MCNC: 122 MG/DL (ref 70–99)
GLUCOSE BLD-MCNC: 192 MG/DL (ref 70–99)
GLUCOSE BLD-MCNC: 214 MG/DL (ref 70–99)
GLUCOSE BLD-MCNC: 215 MG/DL (ref 70–99)
GLUCOSE BLD-MCNC: 229 MG/DL (ref 70–99)
GLUCOSE BLD-MCNC: 62 MG/DL (ref 70–99)
GLUCOSE SERPL-MCNC: 79 MG/DL (ref 70–99)
HCT VFR BLD AUTO: 40.5 % (ref 40.5–52.5)
HGB BLD-MCNC: 14 G/DL (ref 13.5–17.5)
INR PPP: 1.05 (ref 0.84–1.16)
LYMPHOCYTES # BLD: 0.3 K/UL (ref 1–5.1)
LYMPHOCYTES NFR BLD: 14.7 %
MAGNESIUM SERPL-MCNC: 1.8 MG/DL (ref 1.8–2.4)
MCH RBC QN AUTO: 31.6 PG (ref 26–34)
MCHC RBC AUTO-ENTMCNC: 34.6 G/DL (ref 31–36)
MCV RBC AUTO: 91.5 FL (ref 80–100)
MONOCYTES # BLD: 0.1 K/UL (ref 0–1.3)
MONOCYTES NFR BLD: 6.8 %
NEUTROPHILS # BLD: 1.6 K/UL (ref 1.7–7.7)
NEUTROPHILS NFR BLD: 78 %
PATH INTERP BLD-IMP: NORMAL
PERFORMED ON: ABNORMAL
PLATELET # BLD AUTO: 48 K/UL (ref 135–450)
PLATELET BLD QL SMEAR: ABNORMAL
PMV BLD AUTO: 7.9 FL (ref 5–10.5)
POTASSIUM SERPL-SCNC: 3.7 MMOL/L (ref 3.5–5.1)
PROT SERPL-MCNC: 5.7 G/DL (ref 6.4–8.2)
PROTHROMBIN TIME: 13.8 SEC (ref 11.5–14.8)
RBC # BLD AUTO: 4.42 M/UL (ref 4.2–5.9)
SLIDE REVIEW: ABNORMAL
SODIUM SERPL-SCNC: 131 MMOL/L (ref 136–145)
WBC # BLD AUTO: 2.1 K/UL (ref 4–11)

## 2023-08-23 PROCEDURE — 1200000000 HC SEMI PRIVATE

## 2023-08-23 PROCEDURE — 86022 PLATELET ANTIBODIES: CPT

## 2023-08-23 PROCEDURE — 83735 ASSAY OF MAGNESIUM: CPT

## 2023-08-23 PROCEDURE — 2580000003 HC RX 258: Performed by: INTERNAL MEDICINE

## 2023-08-23 PROCEDURE — 2700000000 HC OXYGEN THERAPY PER DAY

## 2023-08-23 PROCEDURE — 94761 N-INVAS EAR/PLS OXIMETRY MLT: CPT

## 2023-08-23 PROCEDURE — 36415 COLL VENOUS BLD VENIPUNCTURE: CPT

## 2023-08-23 PROCEDURE — 87799 DETECT AGENT NOS DNA QUANT: CPT

## 2023-08-23 PROCEDURE — 85610 PROTHROMBIN TIME: CPT

## 2023-08-23 PROCEDURE — G0378 HOSPITAL OBSERVATION PER HR: HCPCS

## 2023-08-23 PROCEDURE — 80053 COMPREHEN METABOLIC PANEL: CPT

## 2023-08-23 PROCEDURE — 6370000000 HC RX 637 (ALT 250 FOR IP): Performed by: INTERNAL MEDICINE

## 2023-08-23 PROCEDURE — 85025 COMPLETE CBC W/AUTO DIFF WBC: CPT

## 2023-08-23 RX ADMIN — INSULIN LISPRO 6 UNITS: 100 INJECTION, SOLUTION INTRAVENOUS; SUBCUTANEOUS at 13:14

## 2023-08-23 RX ADMIN — Medication 10 ML: at 09:04

## 2023-08-23 RX ADMIN — INSULIN LISPRO 1 UNITS: 100 INJECTION, SOLUTION INTRAVENOUS; SUBCUTANEOUS at 17:46

## 2023-08-23 RX ADMIN — INSULIN GLARGINE 80 UNITS: 100 INJECTION, SOLUTION SUBCUTANEOUS at 21:55

## 2023-08-23 RX ADMIN — INSULIN LISPRO 6 UNITS: 100 INJECTION, SOLUTION INTRAVENOUS; SUBCUTANEOUS at 09:01

## 2023-08-23 RX ADMIN — INSULIN LISPRO 6 UNITS: 100 INJECTION, SOLUTION INTRAVENOUS; SUBCUTANEOUS at 17:46

## 2023-08-23 NOTE — CONSULTS
Consult Call Back    Who: Celeste Search  Date:8/23/2023,  Time:2:32 PM    Electronically signed by Alyson Candelaria on 8/23/23 at 2:32 PM EDT

## 2023-08-23 NOTE — PROGRESS NOTES
Physician Progress Note      Obi Flores  [facility] #:                  [id number]  :                       1970  ADMIT DATE:       2023 4:42 PM  [address] [facility] DATE:  RESPONDING  PROVIDER #:        Dalton Tony MD          QUERY TEXT:    Pt admitted with acute viral illness. Noted documentation of sepsis noted on   ED consult note. If possible, please document in progress notes and discharge   summary:      The medical record reflects the following:  Risk Factors: Diabetes, nausea  Clinical Indicators: Per ED consult note \"Sepsis, due to unspecified   organism\". Wbc down to 2.1, temp up to 103.1, pulse up to 120, RR up to 35,   Bands 19%, sodium 130  Treatment: IVF, blood cultures, serial labs, supportive care    Thank you,  Pancho Babcock RN BSN  Options provided:  -- Viral sepsis due to unknown viral infection confirmed present on arrival  -- Sepsis ruled out  -- Other - I will add my own diagnosis  -- Disagree - Not applicable / Not valid  -- Disagree - Clinically unable to determine / Unknown  -- Refer to Clinical Documentation Reviewer    PROVIDER RESPONSE TEXT:    The diagnosis of sepsis was ruled out.     Query created by: Pancho Babcock on 2023 11:42 AM      Electronically signed by:  Dalton Tony MD 2023 12:17 PM

## 2023-08-23 NOTE — PROGRESS NOTES
Hospital Medicine Progress Note      Date of Admission: 8/21/2023  Hospital Day: 3    Chief Admission Complaint:  SOB/Fatigue     Subjective:  no new c/o    Presenting Admission History:       Jennifer Batres is a 45 y.o. male. He presented to the ER from home. He relates he has been feeling \"drained\" since yesterday. He has no energy to do anything. He also described generalized myalgias and intermittent nausea. He ate an unusually small amount today. He has not vomited or had diarrhea. He felt chilled earlier today. He denies all respiratory symptoms. He lives with one of his cousins. He is outside a lot. He has not noticed any recent mosquito, tick, or other bites. Despite feeling ill the patient has continued to take his antidiabetic medications. He already took lantus 80 units tonight before coming to the ER\"    Assessment/Plan:      Current Principal Problem:  Acute febrile illness      Acute febrile illness - possibly Viral Prodrome  -  Clinical syndrome is nonspecific at this point and is consistent with a viral prodrome. Patient was offered d/c to home w/ symptomatic treatment and return precautions or observation in the hospital for more diagnostics and symptom control. He elected to stay in the hospital.  -  Normal WBC count w/ diff notable for 19% bands, 4% atypical lymphocytes. Add monospot to labs. Consider repeat testing for COVID-19 if respiratory symptoms emerge.  -  No clinical syndrome c/w pneumonia. Monitor for emergence of a distinct clinical syndrome. Fever curve improved. DM2 - controlled on home oral antiGlycemics/Insulin - held/continued. Follow FSBS/SSI low regimen. Last HbA1c 8.1% dated Jan 2023. Anticipate resuming/continuing home regimen at discharge. Tobacco Abuse - active and ongoing. Cessation counseled. Nicotine replacement PRN.   CT imaging is c/w findings of upper-lobe predominant brocnhiectasis and fine bands of peripheral interstitial fibrosis Treatment (any 1)   [] Drugs/treatments that require intensive monitoring for toxicity include:    [] IV ABX requiring serial renal monitoring for nephrotoxicity     [] Post-Cath serial renal monitoring for Contrast Induced Nephropathy  [] IV Narcotic analgesia for ADR   [] Aggressive IV diuresis requiring serial renal monitoring for electrolyte derangements  [] Hypertonic Saline requiring serial renal monitoring for appropriate electrolyte correction rate   [] Other -    [] Change in code status:    [] Decision to escalate care:    [] Major surgery/procedure with associated risk factors:    ----------------------------------------------------------------------  C. Data (any 2)  [x] Discussed management of the case with:  Oncology  [] Imaging personally reviewed and interpreted, includes:   [] Telemetry monitoring     [x] Data Review (any 3)  [] Collateral history obtained from:    [x] All available Consultant notes from yesterday/today were reviewed  [x] All current labs were reviewed and interpreted for clinical significance   [x] Appropriate follow-up labs were ordered    Medications:  Personally reviewed in detail in conjunction w/ labs as documented for evidence of drug toxicity. Infusion Medications    dextrose      sodium chloride       Scheduled Medications    enoxaparin  40 mg SubCUTAneous Daily    insulin glargine  80 Units SubCUTAneous Nightly    insulin lispro  0.08 Units/kg SubCUTAneous TID WC    insulin lispro  0-4 Units SubCUTAneous TID WC    insulin lispro  0-4 Units SubCUTAneous Nightly    nicotine  1 patch TransDERmal Daily     PRN Meds: glucose, dextrose bolus **OR** dextrose bolus, glucagon (rDNA), dextrose, sodium chloride flush, sodium chloride, ondansetron **OR** ondansetron, acetaminophen **OR** acetaminophen, melatonin, calcium carbonate     Labs:  Personally reviewed and interpreted for clinical significance.      Recent Labs     08/21/23  1651 08/22/23  0553 08/23/23  0517   WBC 6.1

## 2023-08-23 NOTE — PLAN OF CARE
Problem: Safety - Adult  Goal: Free from fall injury  8/22/2023 2144 by Zeeshan Fontana RN  Outcome: Progressing  8/22/2023 1645 by Lupe Kenney RN  Outcome: Progressing  Flowsheets (Taken 8/22/2023 1645)  Free From Fall Injury:   Instruct family/caregiver on patient safety   Based on caregiver fall risk screen, instruct family/caregiver to ask for assistance with transferring infant if caregiver noted to have fall risk factors

## 2023-08-23 NOTE — PROGRESS NOTES
Shift assessment completed & charted. Pt weaned off oxygen today. SpO2 low-mid 90s on RA. Pt denies SOB, or dyspnea w/ exertion. Pt tachycardic throughout the shift, denies chest pain. SCDs applied earlier per order but removed due to patient request of discomfort. Denies any other pain or discomfort. Bed locked & in lowest position. Call light & bedside table within reach.

## 2023-08-23 NOTE — CARE COORDINATION
Hospital day 2 in OBS care managed by IM and pending consult to Hem/Onc. Patient from home with brother, Aniceto Tello. Patient 95% on 1 L 02 baseline room air. SW following for possible DCP needs. Nia Billing, SOPHY

## 2023-08-23 NOTE — CONSULTS
after the administration of intravenous  contrast.  Multiplanar reformatted images are provided for review. MIP  images are provided for review. Automated exposure control, iterative  reconstruction, and/or weight based adjustment of the mA/kV was utilized to  reduce the radiation dose to as low as reasonably achievable. COMPARISON:  None. HISTORY:  ORDERING SYSTEM PROVIDED HISTORY: tachy/sob  TECHNOLOGIST PROVIDED HISTORY:  Reason for exam:->tachy/sob  Decision Support Exception - unselect if not a suspected or confirmed  emergency medical condition->Emergency Medical Condition (MA)  Reason for Exam: Tired, no energy and SOB since last night  Relevant Medical/Surgical History: 44 years    FINDINGS:  Pulmonary Arteries: Pulmonary arteries are adequately opacified for  evaluation. No evidence of intraluminal filling defect to suggest pulmonary  embolism. Main pulmonary artery is normal in caliber. Mediastinum: Paratracheal nodes measure up to 1.3 cm. AP window nodes  measure up to 1.1 cm precarinal nodes measure up to 1.2 cm and subcarinal  nodes to 1.1 cm. Right hilar nodes measure up to 1.3 cm. The heart and  pericardium demonstrate no acute abnormality. There is no acute abnormality  of the thoracic aorta. Lungs/pleura: Changes of bronchiectasis and mild interstitial lung disease  worse in the lung apices. Tree-in-bud infiltrates in the left lower lobe may  represent acute bronchiectasis. Incidental pulmonary nodule in the left  lower lobe measures 6.4 mm. No evidence of pleural effusion or pneumothorax. Right upper lobe pulmonary nodule measures 7.4 mm. These findings were  previously seen as well on January 27, 2023. Upper Abdomen: Limited images of the upper abdomen are unremarkable. Soft Tissues/Bones: No acute bone or soft tissue abnormality. Impression: No evidence of pulmonary embolism or acute aortic disease.     Changes of bronchiectasis with areas of acute inflammation and tree-in-bud  infiltrates likely representing acute on chronic bronchiectatic changes. Interstitial lung disease worse in the lung apices. No evidence of  consolidative infiltrates. No active pleural disease. Redemonstration of pulmonary nodules as delineated above. RECOMMENDATIONS:  Pathology: 6 mm left solid pulmonary nodule. Per Fleischner Society  Guidelines, recommend a non-contrast Chest CT at 6-12 months. If patient is  high risk for malignancy, recommend an additional non-contrast Chest CT at  18-24 months; if patient is low risk for malignancy a non-contrast Chest CT  at 18-24 months is optional.These guidelines do not apply to  immunocompromised patients and patients with cancer. Follow up in patients  with significant comorbidities as clinically warranted. For lung cancer  screening, adhere to Lung-RADS guidelines. Reference: Radiology. 2017;  284(1):228-43. Problem List  Patient Active Problem List   Diagnosis    Sepsis ([address])    COVID    SOB (shortness of breath)    PNA (pneumonia)    Nausea & vomiting    Tobacco abuse    DM2 (diabetes mellitus, type 2) (HCC)    Acute febrile illness       IMPRESSION/RECOMMENDATIONS:    Leukopenia  Thrombocytopenia   - unclear etiology; possibly post viral infection related, low suspicion for acute leukemic process.  Low suspicion for HIT given that he received lovenox twice  - no LAD, weight loss  - afebrile since  (temp up to 103.1)  - received lovenox prophylaxis  and  but has been held   - previously unremarkable lab trending on chart review   - Lab trendin/15/22 01/30/23 08/21/23 08/22 08/23  WBC 6.7  9.4  6.1  3.6 2.1    222  171  114 48  ANC 5.9  7.4  5.4  3.3 1.6  - monocytes, eosinophils, and basophils unremarkable   - HGB unremarkable   - COVID/flu negative , mono test negative  - reviewed CT AP  unremarkable   - CT PE  shows 6mm solid left pulmonary nodule and 7.4mm right upper lobe pulmonary nodule,

## 2023-08-23 NOTE — PROGRESS NOTES
Pt a/o. VSS. Shift assessment updated and documented. Patient alert and oriented , vitals checked , patient up and sitting in the chair , on oxygen at 2 litres ,denies pain , SOB , chest pain.  Night insulin administered,     No other needs verbalized at this point

## 2023-08-23 NOTE — CONSULTS
Consult Placed via [employer]    Who: Dr. Koch Staff  Date: 8/23/23  Time: 09:43     Electronically signed by Moriah Hernandez on 8/23/2023 at 9:43 AM

## 2023-08-23 NOTE — PLAN OF CARE
Problem: Respiratory - Adult  Goal: Achieves optimal ventilation and oxygenation  Outcome: Progressing  Flowsheets (Taken 8/23/2023 1345)  Achieves optimal ventilation and oxygenation:   Assess for changes in respiratory status   Assess for changes in mentation and behavior   Position to facilitate oxygenation and minimize respiratory effort   Oxygen supplementation based on oxygen saturation or arterial blood gases

## 2023-08-24 LAB
ALBUMIN SERPL-MCNC: 3.1 G/DL (ref 3.4–5)
ALBUMIN/GLOB SERPL: 1.2 {RATIO} (ref 1.1–2.2)
ALP SERPL-CCNC: 78 U/L (ref 40–129)
ALT SERPL-CCNC: 25 U/L (ref 10–40)
ANION GAP SERPL CALCULATED.3IONS-SCNC: 9 MMOL/L (ref 3–16)
AST SERPL-CCNC: 24 U/L (ref 15–37)
BASOPHILS # BLD: 0 K/UL (ref 0–0.2)
BASOPHILS NFR BLD: 1 %
BILIRUB SERPL-MCNC: 0.4 MG/DL (ref 0–1)
BUN SERPL-MCNC: 11 MG/DL (ref 7–20)
CALCIUM SERPL-MCNC: 8.6 MG/DL (ref 8.3–10.6)
CHLORIDE SERPL-SCNC: 99 MMOL/L (ref 99–110)
CO2 SERPL-SCNC: 27 MMOL/L (ref 21–32)
CREAT SERPL-MCNC: 0.7 MG/DL (ref 0.9–1.3)
DEPRECATED RDW RBC AUTO: 14.6 % (ref 12.4–15.4)
EOSINOPHIL # BLD: 0 K/UL (ref 0–0.6)
EOSINOPHIL NFR BLD: 1 %
EST. AVERAGE GLUCOSE BLD GHB EST-MCNC: 197.3 MG/DL
GFR SERPLBLD CREATININE-BSD FMLA CKD-EPI: >60 ML/MIN/{1.73_M2}
GLUCOSE BLD-MCNC: 103 MG/DL (ref 70–99)
GLUCOSE BLD-MCNC: 198 MG/DL (ref 70–99)
GLUCOSE BLD-MCNC: 245 MG/DL (ref 70–99)
GLUCOSE BLD-MCNC: 307 MG/DL (ref 70–99)
GLUCOSE BLD-MCNC: 64 MG/DL (ref 70–99)
GLUCOSE BLD-MCNC: 83 MG/DL (ref 70–99)
GLUCOSE BLD-MCNC: 93 MG/DL (ref 70–99)
GLUCOSE SERPL-MCNC: 151 MG/DL (ref 70–99)
HBA1C MFR BLD: 8.5 %
HCT VFR BLD AUTO: 39.4 % (ref 40.5–52.5)
HGB BLD-MCNC: 13.6 G/DL (ref 13.5–17.5)
INR PPP: 0.96 (ref 0.84–1.16)
LYMPHOCYTES # BLD: 0.8 K/UL (ref 1–5.1)
LYMPHOCYTES NFR BLD: 28 %
MAGNESIUM SERPL-MCNC: 1.9 MG/DL (ref 1.8–2.4)
MCH RBC QN AUTO: 31.9 PG (ref 26–34)
MCHC RBC AUTO-ENTMCNC: 34.4 G/DL (ref 31–36)
MCV RBC AUTO: 92.7 FL (ref 80–100)
MONOCYTES # BLD: 0.1 K/UL (ref 0–1.3)
MONOCYTES NFR BLD: 6 %
NEUTROPHILS # BLD: 0.9 K/UL (ref 1.7–7.7)
NEUTROPHILS NFR BLD: 40 %
NEUTS BAND NFR BLD MANUAL: 8 % (ref 0–7)
PATH INTERP BLD-IMP: NORMAL
PATH INTERP BLD-IMP: NORMAL
PATH INTERP BLD-IMP: YES
PERFORMED ON: ABNORMAL
PERFORMED ON: NORMAL
PERFORMED ON: NORMAL
PLATELET # BLD AUTO: 22 K/UL (ref 135–450)
PLATELET BLD QL SMEAR: ABNORMAL
PMV BLD AUTO: 8.8 FL (ref 5–10.5)
POTASSIUM SERPL-SCNC: 3.9 MMOL/L (ref 3.5–5.1)
PROT SERPL-MCNC: 5.6 G/DL (ref 6.4–8.2)
PROTHROMBIN TIME: 12.8 SEC (ref 11.5–14.8)
RBC # BLD AUTO: 4.25 M/UL (ref 4.2–5.9)
SLIDE REVIEW: ABNORMAL
SMUDGE CELLS BLD QL SMEAR: PRESENT
SODIUM SERPL-SCNC: 135 MMOL/L (ref 136–145)
VARIANT LYMPHS NFR BLD MANUAL: 16 % (ref 0–6)
WBC # BLD AUTO: 1.9 K/UL (ref 4–11)

## 2023-08-24 PROCEDURE — 88185 FLOWCYTOMETRY/TC ADD-ON: CPT

## 2023-08-24 PROCEDURE — 1200000000 HC SEMI PRIVATE

## 2023-08-24 PROCEDURE — 88184 FLOWCYTOMETRY/ TC 1 MARKER: CPT

## 2023-08-24 PROCEDURE — 85025 COMPLETE CBC W/AUTO DIFF WBC: CPT

## 2023-08-24 PROCEDURE — 6370000000 HC RX 637 (ALT 250 FOR IP): Performed by: INTERNAL MEDICINE

## 2023-08-24 PROCEDURE — 83735 ASSAY OF MAGNESIUM: CPT

## 2023-08-24 PROCEDURE — 80053 COMPREHEN METABOLIC PANEL: CPT

## 2023-08-24 PROCEDURE — 85610 PROTHROMBIN TIME: CPT

## 2023-08-24 RX ORDER — INSULIN GLARGINE 100 [IU]/ML
60 INJECTION, SOLUTION SUBCUTANEOUS NIGHTLY
Status: DISCONTINUED | OUTPATIENT
Start: 2023-08-24 | End: 2023-08-25 | Stop reason: HOSPADM

## 2023-08-24 RX ADMIN — INSULIN LISPRO 3 UNITS: 100 INJECTION, SOLUTION INTRAVENOUS; SUBCUTANEOUS at 16:03

## 2023-08-24 RX ADMIN — INSULIN GLARGINE 60 UNITS: 100 INJECTION, SOLUTION SUBCUTANEOUS at 20:33

## 2023-08-24 RX ADMIN — INSULIN LISPRO 6 UNITS: 100 INJECTION, SOLUTION INTRAVENOUS; SUBCUTANEOUS at 16:04

## 2023-08-24 NOTE — PROGRESS NOTES
Pt a/o. VSS. Shift assessment updated and documented. Patient alert and oriented , vitals checked stable , not requiring oxygen , SP 02 above 90 , refused to have his SCDs  , blood sugars checked ,insulin administered.  No other concerns    Will continue to monitor

## 2023-08-24 NOTE — PROGRESS NOTES
Pt is alert and oriented. VSS. RA. Pt denies pain and discomfort at this time. Breath sounds clear bilaterally. Shift assessment completed and documented. Call light within reach. Bed side table within reach. Wheels locked. Bed in lowest position. Pt instructed to call out for assistance. Pt expressesed understanding & calls out appropritately. All care per orders.  Electronically signed by Lesia Chavez RN on 8/24/2023 at 8:53 AM

## 2023-08-24 NOTE — PLAN OF CARE
Problem: Safety - Adult  Goal: Free from fall injury  8/24/2023 0850 by Amrik Cano RN  Outcome: Progressing  Flowsheets (Taken 8/24/2023 1289)  Free From Fall Injury:   Instruct family/caregiver on patient safety   Based on caregiver fall risk screen, instruct family/caregiver to ask for assistance with transferring infant if caregiver noted to have fall risk factors     Problem: Pain  Goal: Verbalizes/displays adequate comfort level or baseline comfort level  8/24/2023 0850 by Amrik Cano RN  Outcome: Progressing  Flowsheets (Taken 8/24/2023 7598)  Verbalizes/displays adequate comfort level or baseline comfort level:   Encourage patient to monitor pain and request assistance   Assess pain using appropriate pain scale   Administer analgesics based on type and severity of pain and evaluate response   Implement non-pharmacological measures as appropriate and evaluate response

## 2023-08-24 NOTE — CARE COORDINATION
Hospital day 1 as inpatient: Patient on C3 re Acute febrile illness care managed by IM and Hem/Onc. Patient from home with brother ROYCE. Patient weaned to room air this date. Follow CBC re neutropenia and thrombocytopenia . SOPHY Albright

## 2023-08-25 VITALS
HEIGHT: 70 IN | TEMPERATURE: 97.7 F | HEART RATE: 83 BPM | DIASTOLIC BLOOD PRESSURE: 67 MMHG | RESPIRATION RATE: 16 BRPM | SYSTOLIC BLOOD PRESSURE: 118 MMHG | OXYGEN SATURATION: 93 % | BODY MASS INDEX: 24.71 KG/M2 | WEIGHT: 172.62 LBS

## 2023-08-25 LAB
ALBUMIN SERPL-MCNC: 3.4 G/DL (ref 3.4–5)
ANION GAP SERPL CALCULATED.3IONS-SCNC: 8 MMOL/L (ref 3–16)
BACTERIA BLD CULT ORG #2: NORMAL
BACTERIA BLD CULT: NORMAL
BUN SERPL-MCNC: 14 MG/DL (ref 7–20)
CALCIUM SERPL-MCNC: 8.8 MG/DL (ref 8.3–10.6)
CHLORIDE SERPL-SCNC: 102 MMOL/L (ref 99–110)
CO2 SERPL-SCNC: 30 MMOL/L (ref 21–32)
CREAT SERPL-MCNC: 0.6 MG/DL (ref 0.9–1.3)
DEPRECATED RDW RBC AUTO: 14.5 % (ref 12.4–15.4)
FOLATE SERPL-MCNC: 10.83 NG/ML (ref 4.78–24.2)
GFR SERPLBLD CREATININE-BSD FMLA CKD-EPI: >60 ML/MIN/{1.73_M2}
GLUCOSE BLD-MCNC: 114 MG/DL (ref 70–99)
GLUCOSE BLD-MCNC: 237 MG/DL (ref 70–99)
GLUCOSE BLD-MCNC: 379 MG/DL (ref 70–99)
GLUCOSE BLD-MCNC: 47 MG/DL (ref 70–99)
GLUCOSE BLD-MCNC: 48 MG/DL (ref 70–99)
GLUCOSE BLD-MCNC: 56 MG/DL (ref 70–99)
GLUCOSE SERPL-MCNC: 43 MG/DL (ref 70–99)
HAV IGM SERPL QL IA: NORMAL
HBV CORE IGM SERPL QL IA: NORMAL
HBV SURFACE AG SERPL QL IA: NORMAL
HCT VFR BLD AUTO: 40.5 % (ref 40.5–52.5)
HCV AB SERPL QL IA: NORMAL
HGB BLD-MCNC: 13.8 G/DL (ref 13.5–17.5)
MCH RBC QN AUTO: 31.8 PG (ref 26–34)
MCHC RBC AUTO-ENTMCNC: 34.2 G/DL (ref 31–36)
MCV RBC AUTO: 93.1 FL (ref 80–100)
PERFORMED ON: ABNORMAL
PHOSPHATE SERPL-MCNC: 3.9 MG/DL (ref 2.5–4.9)
PLATELET # BLD AUTO: 25 K/UL (ref 135–450)
PMV BLD AUTO: 8.6 FL (ref 5–10.5)
POTASSIUM SERPL-SCNC: 3.9 MMOL/L (ref 3.5–5.1)
RBC # BLD AUTO: 4.35 M/UL (ref 4.2–5.9)
SODIUM SERPL-SCNC: 140 MMOL/L (ref 136–145)
VIT B12 SERPL-MCNC: <150 PG/ML (ref 211–911)
WBC # BLD AUTO: 5.2 K/UL (ref 4–11)

## 2023-08-25 PROCEDURE — 80069 RENAL FUNCTION PANEL: CPT

## 2023-08-25 PROCEDURE — 86702 HIV-2 ANTIBODY: CPT

## 2023-08-25 PROCEDURE — 86022 PLATELET ANTIBODIES: CPT

## 2023-08-25 PROCEDURE — 82607 VITAMIN B-12: CPT

## 2023-08-25 PROCEDURE — 6370000000 HC RX 637 (ALT 250 FOR IP): Performed by: INTERNAL MEDICINE

## 2023-08-25 PROCEDURE — 82746 ASSAY OF FOLIC ACID SERUM: CPT

## 2023-08-25 PROCEDURE — 87390 HIV-1 AG IA: CPT

## 2023-08-25 PROCEDURE — 36415 COLL VENOUS BLD VENIPUNCTURE: CPT

## 2023-08-25 PROCEDURE — 85027 COMPLETE CBC AUTOMATED: CPT

## 2023-08-25 PROCEDURE — 86701 HIV-1ANTIBODY: CPT

## 2023-08-25 PROCEDURE — 80074 ACUTE HEPATITIS PANEL: CPT

## 2023-08-25 RX ADMIN — Medication 16 G: at 07:30

## 2023-08-25 RX ADMIN — INSULIN LISPRO 4 UNITS: 100 INJECTION, SOLUTION INTRAVENOUS; SUBCUTANEOUS at 11:50

## 2023-08-25 RX ADMIN — INSULIN LISPRO 6 UNITS: 100 INJECTION, SOLUTION INTRAVENOUS; SUBCUTANEOUS at 11:49

## 2023-08-25 ASSESSMENT — PAIN SCALES - GENERAL
PAINLEVEL_OUTOF10: 0
PAINLEVEL_OUTOF10: 0

## 2023-08-25 NOTE — PROGRESS NOTES
Hospital Medicine Progress Note      Date of Admission: 8/21/2023  Hospital Day: 5    Chief Admission Complaint:  SOB/Fatigue     Subjective:  no new c/o    Presenting Admission History:       Gwenetta Kehr is a 45 y.o. male. He presented to the ER from home. He relates he has been feeling \"drained\" since yesterday. He has no energy to do anything. He also described generalized myalgias and intermittent nausea. He ate an unusually small amount today. He has not vomited or had diarrhea. He felt chilled earlier today. He denies all respiratory symptoms. He lives with one of his cousins. He is outside a lot. He has not noticed any recent mosquito, tick, or other bites. Despite feeling ill the patient has continued to take his antidiabetic medications. He already took lantus 80 units tonight before coming to the ER\"    Assessment/Plan:      Current Principal Problem:  Acute febrile illness      Acute febrile illness - possibly Viral Prodrome  -  Clinical syndrome is nonspecific at this point and is consistent with a viral prodrome. Patient was offered d/c to home w/ symptomatic treatment and return precautions or observation in the hospital for more diagnostics and symptom control. He elected to stay in the hospital.  -  Normal WBC count w/ diff notable for 19% bands, 4% atypical lymphocytes. Add monospot to labs. Consider repeat testing for COVID-19 if respiratory symptoms emerge.  -  No clinical syndrome c/w pneumonia. Monitor for emergence of a distinct clinical syndrome. Fever curve improved. DM2 - controlled on home oral antiGlycemics/Insulin - held/continued. Follow FSBS/SSI low regimen. Last HbA1c 8.1% dated Jan 2023. Anticipate resuming/continuing home regimen at discharge. Tobacco Abuse - active and ongoing. Cessation counseled. Nicotine replacement PRN.   CT imaging is c/w findings of upper-lobe predominant brocnhiectasis and fine bands of peripheral interstitial fibrosis 08/24/23  0547 08/25/23  0636   WBC 2.1* 1.9* 5.2   HGB 14.0 13.6 13.8   HCT 40.5 39.4* 40.5   PLT 48* 22* 25*       Recent Labs     08/23/23  0517 08/24/23  0547 08/25/23  0636   * 135* 140   K 3.7 3.9 3.9   CL 98* 99 102   CO2 25 27 30   BUN 13 11 14   CREATININE 0.8* 0.7* 0.6*   CALCIUM 8.4 8.6 8.8   MG 1.80 1.90  --    PHOS  --   --  3.9       No results for input(s): PROBNP, TROPHS in the last 72 hours. No results for input(s): LABA1C in the last 72 hours. Recent Labs     08/23/23  0517 08/24/23  0547   AST 30 24   ALT 21 25   BILITOT 0.6 0.4   ALKPHOS 75 78       Recent Labs     08/23/23  0517 08/24/23  0547   INR 1.05 0.96         Urine Cultures: No results found for: LABURIN  Blood Cultures:   Lab Results   Component Value Date/Time    [facility]  08/21/2023 07:11 PM     No Growth to date. Any change in status will be called. Lab Results   Component Value Date/Time    BLOODCULT2  08/21/2023 07:11 PM     No Growth to date. Any change in status will be called.      Organism: No results found for: Boo Zarco MD

## 2023-08-25 NOTE — PLAN OF CARE
Problem: Safety - Adult  Goal: Free from fall injury  Outcome: Progressing     Problem: Respiratory - Adult  Goal: Achieves optimal ventilation and oxygenation  Outcome: Progressing     Problem: Pain  Goal: Verbalizes/displays adequate comfort level or baseline comfort level  Outcome: Progressing

## 2023-08-25 NOTE — CARE COORDINATION
CASE MANAGEMENT DISCHARGE SUMMARY    Discharge to: Home     Transportation: Self transport     Confirmed discharge plan with: Patient     RN, name: Vicki Lowe RN    Note: Discharging nurse to complete ROSALIO, reconcile AVS, and place final copy with patient's discharge packet.    SOPHY Winter

## 2023-08-25 NOTE — PLAN OF CARE
Problem: Safety - Adult  Goal: Free from fall injury  8/25/2023 1356 by Lizabeth Limon RN  Outcome: Progressing  8/25/2023 0232 by Dayana Altamirano RN  Outcome: Progressing     Problem: Respiratory - Adult  Goal: Achieves optimal ventilation and oxygenation  8/25/2023 1356 by Lizabeth Limon RN  Outcome: Progressing  8/25/2023 0232 by Dayaan Altamirano RN  Outcome: Progressing     Problem: Pain  Goal: Verbalizes/displays adequate comfort level or baseline comfort level  8/25/2023 1356 by Lizabeth Limon RN  Outcome: Progressing  8/25/2023 0232 by Dayana Altamirano RN  Outcome: Progressing     Problem: Chronic Conditions and Co-morbidities  Goal: Patient's chronic conditions and co-morbidity symptoms are monitored and maintained or improved  Outcome: Progressing

## 2023-08-25 NOTE — PROGRESS NOTES
Pt blood sugar is 48. Pt is alert and orientated. Pt drinking orange juice at this time. Gavin Valencia RN

## 2023-08-25 NOTE — DISCHARGE SUMMARY
Hospital Medicine Discharge Summary    Patient: Martine Waters   : 1970     Admit Date: 2023   Discharge Date: 2023    Disposition:  []Home   []HHC  []SNF  []Acute Rehab  []LTAC  []Hospice  Code status:  []Full  []DNR/CCA  []Limited (DNR/CCA with Do Not Intubate)  []DNRCC  Condition at Discharge: Stable  Primary Care Provider: Alycia Sellers DO    Admitting Provider: Dionna Peter MD  Discharge Provider: Dionna Peter MD     Discharge Diagnoses: Active Hospital Problems    Diagnosis     Tobacco abuse [Z72.0]      Priority: Medium    Acute respiratory failure with hypoxia (HCC) [J96.01]     DM2 (diabetes mellitus, type 2) ([address] [facility]) [E11.9]     Acute febrile illness [R50.9]        Presenting Admission History:      Martine Waters is a 45 y.o. male. He presented to the ER from home. He relates he has been feeling \"drained\" since yesterday. He has no energy to do anything. He also described generalized myalgias and intermittent nausea. He ate an unusually small amount today. He has not vomited or had diarrhea. He felt chilled earlier today. He denies all respiratory symptoms. He lives with one of his cousins. He is outside a lot. He has not noticed any recent mosquito, tick, or other bites. Despite feeling ill the patient has continued to take his antidiabetic medications. He already took lantus 80 units tonight before coming to the ER\"     Assessment/Plan:         Acute febrile illness - possibly Viral Prodrome  -  Clinical syndrome is nonspecific at this point and is consistent with a viral prodrome. Patient was offered d/c to home w/ symptomatic treatment and return precautions or observation in the hospital for more diagnostics and symptom control. He elected to stay in the hospital.  -  Normal WBC count w/ diff notable for 19% bands, 4% atypical lymphocytes. Add monospot to labs.   Consider repeat testing for COVID-19 if respiratory symptoms emerge.  -  No clinical iterative reconstruction, and/or weight based adjustment of the mA/kV was utilized to reduce the radiation dose to as low as reasonably achievable. COMPARISON: None. HISTORY: ORDERING SYSTEM PROVIDED HISTORY: tachy/sob TECHNOLOGIST PROVIDED HISTORY: Reason for exam:->tachy/sob Decision Support Exception - unselect if not a suspected or confirmed emergency medical condition->Emergency Medical Condition (MA) Reason for Exam: Tired, no energy and SOB since last night Relevant Medical/Surgical History: 44 years FINDINGS: Pulmonary Arteries: Pulmonary arteries are adequately opacified for evaluation. No evidence of intraluminal filling defect to suggest pulmonary embolism. Main pulmonary artery is normal in caliber. Mediastinum: Paratracheal nodes measure up to 1.3 cm. AP window nodes measure up to 1.1 cm precarinal nodes measure up to 1.2 cm and subcarinal nodes to 1.1 cm. Right hilar nodes measure up to 1.3 cm. The heart and pericardium demonstrate no acute abnormality. There is no acute abnormality of the thoracic aorta. Lungs/pleura: Changes of bronchiectasis and mild interstitial lung disease worse in the lung apices. Tree-in-bud infiltrates in the left lower lobe may represent acute bronchiectasis. Incidental pulmonary nodule in the left lower lobe measures 6.4 mm. No evidence of pleural effusion or pneumothorax. Right upper lobe pulmonary nodule measures 7.4 mm. These findings were previously seen as well on January 27, 2023. Upper Abdomen: Limited images of the upper abdomen are unremarkable. Soft Tissues/Bones: No acute bone or soft tissue abnormality. No evidence of pulmonary embolism or acute aortic disease. Changes of bronchiectasis with areas of acute inflammation and tree-in-bud infiltrates likely representing acute on chronic bronchiectatic changes. Interstitial lung disease worse in the lung apices. No evidence of consolidative infiltrates. No active pleural disease.  Redemonstration of

## 2023-08-25 NOTE — PROGRESS NOTES
Pt taken to vehicle by wheelchair and safe on departure. Pt stated that he understood the discharge instructions. Melvin Le RN

## 2023-08-25 NOTE — PROGRESS NOTES
Dr. Curtis Rodriguez said it was ok for pt to drive himself home. Pt instructed to make sure he wore his seat belt. Ozzy Ibarra RN

## 2023-08-25 NOTE — PROGRESS NOTES
Pt states that he wants to drive himself home and that he does not want oxygen for home.  .Lindsey Aguila RN

## 2023-08-25 NOTE — PROGRESS NOTES
Discharge instructions given to pt. Pt verbalized understanding with no questions or concerns. Konstantin Meadows RN

## 2023-08-26 LAB
EBV DNA SERPL NAA+PROBE-ACNC: 38 IU/ML
EBV DNA SERPL NAA+PROBE-LOG#: 1.58 LOG IU/ML
EBV DNA SPEC QL NAA+PROBE: DETECTED
HEPARIN INDUCED PLATELET ANTIBODY: NEGATIVE
HIV 1+2 AB+HIV1 P24 AG SERPL QL IA: REACTIVE
HIV 2 AB SERPL QL IA: ABNORMAL
HIV1 AB SERPL QL IA: ABNORMAL
HIV1 P24 AG SERPL QL IA: REACTIVE

## 2023-08-30 LAB
HIV INTERPRETATION: NEGATIVE
HIV-1 ANTIBODY: NEGATIVE
HIV-2 AB: NEGATIVE

## 2023-08-31 ENCOUNTER — TELEPHONE (OUTPATIENT)
Dept: CASE MANAGEMENT | Age: 53
End: 2023-08-31

## 2023-08-31 NOTE — TELEPHONE ENCOUNTER
Imaging report CT Chest 8/21/23 with f/u imaging recommendations sent to Praveen Bonilla DO    May consider pulmonology referral for nodules greater than or equal to 6 mm    [address] [facility] CANELO(R)  [email or web address]

## 2023-09-05 ENCOUNTER — TELEPHONE (OUTPATIENT)
Dept: INFECTIOUS DISEASES | Age: 53
End: 2023-09-05

## 2023-09-05 NOTE — TELEPHONE ENCOUNTER
Kristie Huitron from Dr. Khalif Saldana office called stating that patient declined referral to ID. Referral closed.

## 2023-09-05 NOTE — TELEPHONE ENCOUNTER
Attempted to call patient to schedule NPV with Dr. Denver Mckinney    Patients VM not set up      Dx: HIV Positive  Referred by: Dr. Lita Lerner MD  Referral scanned into media  Records in epic    I called [facility] ([phone]) and spoke with Meenu Freeman letting her know that I was unable to get ahold of patient.